# Patient Record
Sex: FEMALE | Race: WHITE | NOT HISPANIC OR LATINO
[De-identification: names, ages, dates, MRNs, and addresses within clinical notes are randomized per-mention and may not be internally consistent; named-entity substitution may affect disease eponyms.]

---

## 2019-04-25 ENCOUNTER — NON-APPOINTMENT (OUTPATIENT)
Age: 64
End: 2019-04-25

## 2019-04-25 ENCOUNTER — APPOINTMENT (OUTPATIENT)
Dept: CARDIOLOGY | Facility: CLINIC | Age: 64
End: 2019-04-25
Payer: COMMERCIAL

## 2019-04-25 VITALS
BODY MASS INDEX: 23.86 KG/M2 | DIASTOLIC BLOOD PRESSURE: 98 MMHG | WEIGHT: 152 LBS | HEIGHT: 67 IN | SYSTOLIC BLOOD PRESSURE: 172 MMHG

## 2019-04-25 DIAGNOSIS — F17.200 NICOTINE DEPENDENCE, UNSPECIFIED, UNCOMPLICATED: ICD-10-CM

## 2019-04-25 PROCEDURE — 93000 ELECTROCARDIOGRAM COMPLETE: CPT

## 2019-04-25 PROCEDURE — 99244 OFF/OP CNSLTJ NEW/EST MOD 40: CPT

## 2019-04-25 NOTE — ASSESSMENT
[FreeTextEntry1] : #1 abnormal EKG--most likely related to left bundle branch block and possibly LVH as well.\par I do not see any definite evidence to suggest prior infarction although I cannot exclude this. I do suspect that the EKG changes reflect previously unrecognized hypertension as well as left bundle branch block. The latter was intermittently present on EKG of April 23 but is present throughout today. This may be rate related left bundle branch block.\par Given this patient's risk factors of lifelong smoking and positive family history and age of 63, the presence of coronary artery disease must be evaluated. Because of the presence of a left bundle branch block, stress testing will need to be pharmacologic in the form of LexiScan Myoview.\par \par #2 possible essential hypertension with elevated blood pressure today\par \par #3 likely new onset left bundle branch block which may be idiopathic, or reflect the presence of hypertension, or even CAD\par \par #4 unknown lipid status\par \par #5 lifelong cigarette smoking and addiction--will require a chest x-ray to start which has already been ordered by Dr. Mo.\par \par #6 for a left breast mass: Scheduled for mammogram by Dr. Mo\par \par #7 need to obtain lab work, chest x-ray, and carotid ultrasound as well as mammogram which we'll order by Dr. Mo.

## 2019-04-25 NOTE — HISTORY OF PRESENT ILLNESS
[FreeTextEntry1] : The patient was referred by Dr. Guilherme Mo([phone]) because of an abnormal EKG.\par \par The patient had not seen a physician in many years but recently felt a lump in her left breast. She saw Dr. Mo and he confirmed the presence of a mass and ordered a mammogram. He also performed an EKG and informed her that it was abnormal and that she might have had a heart attack at some point. She was referred here.\par \par The patient has no prior cardiac history. Specifically no history of heart attack or chest pains that she can recall. No significant shortness of breath or dyspnea on exertion. No history of heart murmur or rheumatic fever. No history of dizziness, lightheadedness, syncope, or palpitations. No recent fevers or chills. Her weight is stable.\par --Risk factors for CAD include the  following: Smoking (originally a pack and a half per day but now down to less than one pack) and positive family history (father  of an MI in his 60s).\par --There is no history of hypertension, diabetes, or hyperlipidemia although it is again noted that she had not seen a physician for many years.\par \par EKG from Dr. Webb office from 2019 reveals sinus rhythm with normal axis increased voltage, poor R-wave progression, and a rate related left bundle branch block versus PVCs.\par \par An EKG performed today in this office reveals sinus rhythm without classic left bundle branch block.

## 2019-04-25 NOTE — PHYSICAL EXAM
[General Appearance - Well Developed] : well developed [Normal Appearance] : normal appearance [Well Groomed] : well groomed [General Appearance - Well Nourished] : well nourished [No Deformities] : no deformities [General Appearance - In No Acute Distress] : no acute distress [Normal Conjunctiva] : the conjunctiva exhibited no abnormalities [Eyelids - No Xanthelasma] : the eyelids demonstrated no xanthelasmas [No Oral Pallor] : no oral pallor [Normal Oral Mucosa] : normal oral mucosa [Normal Jugular Venous V Waves Present] : normal jugular venous V waves present [Normal Jugular Venous A Waves Present] : normal jugular venous A waves present [No Oral Cyanosis] : no oral cyanosis [No Jugular Venous Mac A Waves] : no jugular venous mac A waves [Respiration, Rhythm And Depth] : normal respiratory rhythm and effort [Exaggerated Use Of Accessory Muscles For Inspiration] : no accessory muscle use [Normal] : normal [5th Left ICS - MCL] : palpated at the 5th LICS in the midclavicular line [Auscultation Breath Sounds / Voice Sounds] : lungs were clear to auscultation bilaterally [Normal Rate] : normal [Normal S1] : normal S1 [Rhythm Regular] : regular [No Murmur] : no murmurs heard [Normal S2] : normal S2 [No Abnormalities] : the abdominal aorta was not enlarged and no bruit was heard [Abdomen Soft] : soft [Abdomen Tenderness] : non-tender [Abnormal Walk] : normal gait [Abdomen Mass (___ Cm)] : no abdominal mass palpated [Nail Clubbing] : no clubbing of the fingernails [Gait - Sufficient For Exercise Testing] : the gait was sufficient for exercise testing [Cyanosis, Localized] : no localized cyanosis [Petechial Hemorrhages (___cm)] : no petechial hemorrhages [Skin Color & Pigmentation] : normal skin color and pigmentation [] : no rash [No Venous Stasis] : no venous stasis [Skin Lesions] : no skin lesions [No Skin Ulcers] : no skin ulcer [No Xanthoma] : no  xanthoma was observed [Mood] : the mood was normal [Affect] : the affect was normal [Oriented To Time, Place, And Person] : oriented to person, place, and time [No Anxiety] : not feeling anxious [Right Carotid Bruit] : no bruit heard over the right carotid [Left Carotid Bruit] : no bruit heard over the left carotid [Right Femoral Bruit] : no bruit heard over the right femoral artery [Left Femoral Bruit] : no bruit heard over the left femoral artery [Rt] : no varicose veins of the right leg [Lt] : no varicose veins of the left leg

## 2019-04-26 ENCOUNTER — TRANSCRIPTION ENCOUNTER (OUTPATIENT)
Age: 64
End: 2019-04-26

## 2019-05-09 ENCOUNTER — RESULT REVIEW (OUTPATIENT)
Age: 64
End: 2019-05-09

## 2019-05-23 NOTE — HISTORY OF PRESENT ILLNESS
[FreeTextEntry1] : The patient was referred by Dr. Guilherme Mo ([phone]) because of an abnormal EKG.\par \par The patient had not seen a physician in many years but recently felt a lump in her left breast. She saw Dr. Mo and he confirmed the presence of a mass and ordered a mammogram. He also performed an EKG and informed her that it was abnormal and that she might have had a heart attack at some point. She was referred here.\par \par The patient has no prior cardiac history. Specifically no history of heart attack or chest pains that she can recall. No significant shortness of breath or dyspnea on exertion. No history of heart murmur or rheumatic fever. No history of dizziness, lightheadedness, syncope, or palpitations. No recent fevers or chills. Her weight is stable.\par --Risk factors for CAD include the  following: Smoking (originally a pack and a half per day but now down to less than one pack) and positive family history (father  of an MI in his 60s).\par --There is no history of hypertension, diabetes, or hyperlipidemia although it is again noted that she had not seen a physician for many years.\par \par EKG from Dr. Webb office from 2019 reveals sinus rhythm with normal axis increased voltage, poor R-wave progression, and a rate related left bundle branch block versus PVCs.\par \par An EKG performed today in this office reveals sinus rhythm without classic left bundle branch block.\par She was referred for Lexiscan stress testing, due to LBBB:\par Lexiscan THALLIUM (Myoview not available, apparently): May 9, 2019\par Her were no symptoms and there were no diagnostic EKG changes.\par SPECT imaging revealed a small zone of moderately diminished tracer uptake in the anteroseptal segment. There may be mild improvement on rest imaging.\par Normal left size, wall motion, and ejection fraction of 79%.\par Also I thought reveals a mainly fixed anterior septal defect

## 2019-05-23 NOTE — ASSESSMENT
[FreeTextEntry1] : #1 EKG changes are subtle and likely reflect intermittent left bundle branch block and possibly LVH as well.\par I do not see any definite evidence to suggest prior infarction. I do suspect that the EKG changes reflect previously unrecognized hypertension as well as left bundle branch block.\par The stress test is borderline. SPECT imaging suggests a very small and mild zone of intraseptal ischemia. Both light clots show a fixed defect. Most likely these changes reflect the presence of left bundle-branch block although I cannot exclude a small and mild degree of ischemia. In any case, this is certainly a low risk test suggesting that it is either normal or compatible with mild single-vessel disease. Therefore there is no contraindication to proceeding with surgery.\par \par \par #2 possible essential hypertension with elevated blood pressure today\par \par #3 New left bundle branch block --not clinically significant\par \par #4 unknown lipid status\par \par #5 lifelong cigarette smoking and addiction--will require a chest x-ray to start which has already been ordered by Dr. Mo.\par \par #6 for a left breast mass: Scheduled for mammogram by Dr. Mo\par \par There is no contraindication to any surgery if indicated. She is asymptomatic woman, without any recent clinical events, with preserved and normal LV function, and now a normal or borderline normal and certainly low risk nuclear stress test. It is therefore safe to proceed. I would consider her to be low risk for any operative procedure for a woman of 63 years. Before she has been advised to discontinue smoking in advance of any operative procedure.

## 2019-05-23 NOTE — PHYSICAL EXAM
[General Appearance - Well Developed] : well developed [Normal Appearance] : normal appearance [Well Groomed] : well groomed [General Appearance - Well Nourished] : well nourished [No Deformities] : no deformities [General Appearance - In No Acute Distress] : no acute distress [Normal Conjunctiva] : the conjunctiva exhibited no abnormalities [Eyelids - No Xanthelasma] : the eyelids demonstrated no xanthelasmas [Normal Oral Mucosa] : normal oral mucosa [No Oral Pallor] : no oral pallor [No Oral Cyanosis] : no oral cyanosis [Normal Jugular Venous A Waves Present] : normal jugular venous A waves present [Normal Jugular Venous V Waves Present] : normal jugular venous V waves present [No Jugular Venous Mac A Waves] : no jugular venous mac A waves [Respiration, Rhythm And Depth] : normal respiratory rhythm and effort [Exaggerated Use Of Accessory Muscles For Inspiration] : no accessory muscle use [Auscultation Breath Sounds / Voice Sounds] : lungs were clear to auscultation bilaterally [Abdomen Soft] : soft [Abdomen Tenderness] : non-tender [Abdomen Mass (___ Cm)] : no abdominal mass palpated [Abnormal Walk] : normal gait [Gait - Sufficient For Exercise Testing] : the gait was sufficient for exercise testing [Nail Clubbing] : no clubbing of the fingernails [Cyanosis, Localized] : no localized cyanosis [Petechial Hemorrhages (___cm)] : no petechial hemorrhages [Skin Color & Pigmentation] : normal skin color and pigmentation [] : no rash [No Venous Stasis] : no venous stasis [Skin Lesions] : no skin lesions [No Skin Ulcers] : no skin ulcer [No Xanthoma] : no  xanthoma was observed [Oriented To Time, Place, And Person] : oriented to person, place, and time [Affect] : the affect was normal [Mood] : the mood was normal [No Anxiety] : not feeling anxious [5th Left ICS - MCL] : palpated at the 5th LICS in the midclavicular line [Normal] : normal [Normal Rate] : normal [Rhythm Regular] : regular [Normal S1] : normal S1 [Normal S2] : normal S2 [No Murmur] : no murmurs heard [No Abnormalities] : the abdominal aorta was not enlarged and no bruit was heard [Right Carotid Bruit] : no bruit heard over the right carotid [Left Carotid Bruit] : no bruit heard over the left carotid [Right Femoral Bruit] : no bruit heard over the right femoral artery [Left Femoral Bruit] : no bruit heard over the left femoral artery [Rt] : no varicose veins of the right leg [Lt] : no varicose veins of the left leg

## 2019-05-30 ENCOUNTER — APPOINTMENT (OUTPATIENT)
Dept: CARDIOLOGY | Facility: CLINIC | Age: 64
End: 2019-05-30
Payer: COMMERCIAL

## 2019-06-02 ENCOUNTER — FORM ENCOUNTER (OUTPATIENT)
Age: 64
End: 2019-06-02

## 2019-06-06 ENCOUNTER — APPOINTMENT (OUTPATIENT)
Dept: CARDIOLOGY | Facility: CLINIC | Age: 64
End: 2019-06-06
Payer: COMMERCIAL

## 2019-06-06 ENCOUNTER — NON-APPOINTMENT (OUTPATIENT)
Age: 64
End: 2019-06-06

## 2019-06-06 VITALS
HEIGHT: 67 IN | WEIGHT: 152 LBS | BODY MASS INDEX: 23.86 KG/M2 | DIASTOLIC BLOOD PRESSURE: 74 MMHG | SYSTOLIC BLOOD PRESSURE: 126 MMHG

## 2019-06-06 PROCEDURE — 36415 COLL VENOUS BLD VENIPUNCTURE: CPT

## 2019-06-06 PROCEDURE — 93000 ELECTROCARDIOGRAM COMPLETE: CPT

## 2019-06-06 PROCEDURE — 99214 OFFICE O/P EST MOD 30 MIN: CPT

## 2019-06-06 RX ORDER — UBIDECARENONE 30 MG
CAPSULE ORAL
Refills: 0 | Status: ACTIVE | COMMUNITY

## 2019-06-06 RX ORDER — UBIDECARENONE/VIT E ACET 100MG-5
CAPSULE ORAL
Refills: 0 | Status: ACTIVE | COMMUNITY

## 2019-06-06 NOTE — HISTORY OF PRESENT ILLNESS
[FreeTextEntry1] : The patient was referred by Dr. Guilherme Mo ([phone]) because of an abnormal EKG.\par \par The patient had not seen a physician in many years but recently felt a lump in her left breast. She saw Dr. Mo and he confirmed the presence of a mass and ordered a mammogram. He also performed an EKG and informed her that it was abnormal and she was told that she might have had a heart attack at some point. She was referred here.\par \par The patient has no prior cardiac history. Specifically no history of heart attack or chest pains that she can recall. No significant shortness of breath or dyspnea on exertion. No history of heart murmur or rheumatic fever. No history of dizziness, lightheadedness, syncope, or palpitations. No recent fevers or chills. Her weight is stable.\par --Risk factors for CAD include the  following: Smoking (originally a pack and a half per day but now down to less than one pack) and positive family history (father  of an MI in his 60s).\par --There is no history of hypertension, diabetes, or hyperlipidemia although it is again noted that she had not seen a physician for many years.\par \par EKG from Dr. Webb office from 2019 reveals sinus rhythm with normal axis increased voltage, poor R-wave progression, and a rate related left bundle branch block versus PVCs.\par \par An EKG performed today in this office reveals sinus rhythm without classic left bundle branch block.\par ===============================================================================\par She was referred for Lexiscan stress testing, due to LBBB:\par Lexiscan THALLIUM (Myoview not available, apparently): May 9, 2019\par Her were no symptoms and there were no diagnostic EKG changes.\par SPECT imaging revealed a small zone of moderately diminished tracer uptake in the anteroseptal segment. There may be mild improvement on rest imaging.\par Normal left size, wall motion, and ejection fraction of 79%.\par Also there is a mainly fixed mild anterior septal defect\par \par NB: Labs from Dr. Mo on 19 revealed an elevated Chol of 270, with LDL of 170 !!\par and HDL of 65, trig 140. He started her on Simvastatin 20 mg\par \par Carotid u/s ( 2019:  \par 1) Normal Left ICA\par 2) 6069% ELIAS stenosis

## 2019-06-06 NOTE — ASSESSMENT
[FreeTextEntry1] : Guilherme:\par #1 EKG changes are due to the presence of a left bundle branch block. It is impossible to interpret possible past infarction in the setting of left bundle branch block. This is most likely related to age as well as perhaps hypertension.\par \par 2) Possible, but doubt obstructive CAD--The stress test is borderline. SPECT imaging suggests a very small and mild zone of intraseptal ischemia. Both polar maps show a fixed defect. Most likely these changes reflect the presence of left bundle-branch block and/or breast attenuation artifact, although I cannot exclude a small and mild degree of ischemia. In any case, this is certainly a low risk test suggesting that it is either normal or compatible with mild single-vessel disease. Will per [facility] medical therapy in this asymptomatic individual with borderline and low risk ETT. Management will be BP control, smoking cessation, statin therapy for lipids.\par  3) Therefore there is no contraindication to proceeding with breast surgery.  She is a low risk surgical candidate for this low to moderate risk procedure.\par 4)  essential hypertension with elevated blood pressure is again elevated today. To start Losartan HCTZ 50/12.5 daily\par #5 Mixed hyperlipidemia--now on Simvastatin. Will recheck labs after 1 month\par #6  lifelong cigarette smoking and addiction--attempting to quit\par #7 for a left breast mass: OK for breast surgery if indicated. No contraindication.\par #8 Nonobstructive Carotid dx--on ASA, statin\par \par There is no contraindication to any surgery if indicated. She is asymptomatic woman, without any recent clinical events, with preserved and normal LV function, and now a normal or borderline normal and certainly low risk nuclear stress test. It is therefore safe to proceed. I would consider her to be low risk for any operative procedure for a woman of 63 years. Before she has been advised to discontinue smoking in advance of any operative procedure.

## 2019-06-08 LAB
BASOPHILS # BLD AUTO: 0.05 K/UL
BASOPHILS NFR BLD AUTO: 0.5 %
EOSINOPHIL # BLD AUTO: 0.45 K/UL
EOSINOPHIL NFR BLD AUTO: 4.2 %
HCT VFR BLD CALC: 52.6 %
HGB BLD-MCNC: 16.4 G/DL
IMM GRANULOCYTES NFR BLD AUTO: 0.4 %
LYMPHOCYTES # BLD AUTO: 2.89 K/UL
LYMPHOCYTES NFR BLD AUTO: 27.1 %
MAN DIFF?: NORMAL
MCHC RBC-ENTMCNC: 30.7 PG
MCHC RBC-ENTMCNC: 31.2 GM/DL
MCV RBC AUTO: 98.5 FL
MONOCYTES # BLD AUTO: 0.53 K/UL
MONOCYTES NFR BLD AUTO: 5 %
NEUTROPHILS # BLD AUTO: 6.71 K/UL
NEUTROPHILS NFR BLD AUTO: 62.8 %
PLATELET # BLD AUTO: 251 K/UL
RBC # BLD: 5.34 M/UL
RBC # FLD: 15 %
WBC # FLD AUTO: 10.67 K/UL

## 2019-06-09 ENCOUNTER — FORM ENCOUNTER (OUTPATIENT)
Age: 64
End: 2019-06-09

## 2019-06-10 LAB
ALBUMIN SERPL ELPH-MCNC: 4.9 G/DL
ALP BLD-CCNC: 101 U/L
ALT SERPL-CCNC: 28 U/L
ANION GAP SERPL CALC-SCNC: 11 MMOL/L
AST SERPL-CCNC: 24 U/L
BILIRUB SERPL-MCNC: 0.3 MG/DL
BUN SERPL-MCNC: 10 MG/DL
CALCIUM SERPL-MCNC: 10.4 MG/DL
CHLORIDE SERPL-SCNC: 103 MMOL/L
CHOLEST SERPL-MCNC: 212 MG/DL
CHOLEST/HDLC SERPL: 3 RATIO
CO2 SERPL-SCNC: 29 MMOL/L
CREAT SERPL-MCNC: 0.6 MG/DL
GLUCOSE SERPL-MCNC: 85 MG/DL
HDLC SERPL-MCNC: 70 MG/DL
LDLC SERPL CALC-MCNC: 104 MG/DL
POTASSIUM SERPL-SCNC: 4.9 MMOL/L
PROT SERPL-MCNC: 6.3 G/DL
SODIUM SERPL-SCNC: 143 MMOL/L
TRIGL SERPL-MCNC: 188 MG/DL

## 2019-06-16 ENCOUNTER — FORM ENCOUNTER (OUTPATIENT)
Age: 64
End: 2019-06-16

## 2019-06-23 ENCOUNTER — FORM ENCOUNTER (OUTPATIENT)
Age: 64
End: 2019-06-23

## 2019-06-30 ENCOUNTER — FORM ENCOUNTER (OUTPATIENT)
Age: 64
End: 2019-06-30

## 2019-07-22 ENCOUNTER — RX RENEWAL (OUTPATIENT)
Age: 64
End: 2019-07-22

## 2019-08-01 NOTE — PHYSICAL EXAM
[General Appearance - Well Developed] : well developed [Normal Appearance] : normal appearance [Well Groomed] : well groomed [General Appearance - Well Nourished] : well nourished [No Deformities] : no deformities [General Appearance - In No Acute Distress] : no acute distress [Normal Conjunctiva] : the conjunctiva exhibited no abnormalities [Eyelids - No Xanthelasma] : the eyelids demonstrated no xanthelasmas [Normal Oral Mucosa] : normal oral mucosa [No Oral Cyanosis] : no oral cyanosis [No Oral Pallor] : no oral pallor [Normal Jugular Venous A Waves Present] : normal jugular venous A waves present [No Jugular Venous Mac A Waves] : no jugular venous mac A waves [Normal Jugular Venous V Waves Present] : normal jugular venous V waves present [Respiration, Rhythm And Depth] : normal respiratory rhythm and effort [Exaggerated Use Of Accessory Muscles For Inspiration] : no accessory muscle use [Auscultation Breath Sounds / Voice Sounds] : lungs were clear to auscultation bilaterally [Abdomen Soft] : soft [Abdomen Tenderness] : non-tender [Abdomen Mass (___ Cm)] : no abdominal mass palpated [Abnormal Walk] : normal gait [Gait - Sufficient For Exercise Testing] : the gait was sufficient for exercise testing [Nail Clubbing] : no clubbing of the fingernails [Cyanosis, Localized] : no localized cyanosis [Petechial Hemorrhages (___cm)] : no petechial hemorrhages [Skin Color & Pigmentation] : normal skin color and pigmentation [] : no rash [No Venous Stasis] : no venous stasis [Skin Lesions] : no skin lesions [No Skin Ulcers] : no skin ulcer [No Xanthoma] : no  xanthoma was observed [Oriented To Time, Place, And Person] : oriented to person, place, and time [Mood] : the mood was normal [Affect] : the affect was normal [No Anxiety] : not feeling anxious [Normal] : normal [5th Left ICS - MCL] : palpated at the 5th LICS in the midclavicular line [Normal Rate] : normal [Rhythm Regular] : regular [Normal S1] : normal S1 [Normal S2] : normal S2 [No Murmur] : no murmurs heard [No Abnormalities] : the abdominal aorta was not enlarged and no bruit was heard [Right Carotid Bruit] : no bruit heard over the right carotid [Left Carotid Bruit] : no bruit heard over the left carotid [Right Femoral Bruit] : no bruit heard over the right femoral artery [Left Femoral Bruit] : no bruit heard over the left femoral artery [Rt] : no varicose veins of the right leg [Lt] : no varicose veins of the left leg

## 2019-08-01 NOTE — HISTORY OF PRESENT ILLNESS
[FreeTextEntry1] : The patient was referred by Dr. Guilherme Mo ([phone]) because of an abnormal EKG (LBBB).\par She is following up now for management of both Hypertension and Hyperlipidemia\par \par Scenario: The patient had not seen a physician in many years but recently felt a lump in her left breast. She saw Dr. Mo and he confirmed the presence of a mass and ordered a mammogram. He also performed an EKG and informed her that it was abnormal and she was told that she might have had a heart attack at some point. She was referred here.\par \par The patient has no prior cardiac history. Specifically no history of heart attack or chest pains that she can recall. No significant shortness of breath or dyspnea on exertion. No history of heart murmur or rheumatic fever. No history of dizziness, lightheadedness, syncope, or palpitations. No recent fevers or chills. Her weight is stable.\par --Risk factors for CAD include the  following: Smoking (originally a pack and a half per day but now down to less than one pack) and positive family history (father  of an MI in his 60s).\par --There is no history of hypertension, diabetes, or hyperlipidemia although it is again noted that she had not seen a physician for many years.\par \par EKG from Dr. Webb office from 2019 reveals sinus rhythm with normal axis increased voltage, poor R-wave progression, and a rate related left bundle branch block versus PVCs.\par \par An EKG performed  in this office reveals sinus rhythm without classic left bundle branch block.\par ===============================================================================\par She was referred for Lexiscan stress testing, due to LBBB:\par Lexiscan THALLIUM (Myoview not available, apparently): May 9, 2019\par Her were no symptoms and there were no diagnostic EKG changes.\par SPECT imaging revealed a small zone of moderately diminished tracer uptake in the anteroseptal segment. There may be mild improvement on rest imaging.\par Normal left size, wall motion, and ejection fraction of 79%.\par Also there is a mainly fixed mild anterior septal defect\par \par NB: Labs from Dr. Mo on 19 revealed an elevated Chol of 270, with LDL of 170 !!\par and HDL of 65, trig 140. He started her on Simvastatin 20 mg\par \par Carotid u/s ( 2019:  \par 1) Normal Left ICA\par 2) 6069% ELIAS stenosis\par ===================================================================================\par Currently, she remains under treatment for:\par 1)  hypertension started using losartan--HCTZ in .  \par 2) hyperlipidemia, on simvastatin.\par She continues to feel well. She is quite asymptomatic. Specifically, no cardiovascular symptoms such as chest discomfort, shortness of breath, dyspnea on exertion, orthopnea, or PND. She is taking and tolerating her medications. No new issues since last visit.

## 2019-08-08 ENCOUNTER — APPOINTMENT (OUTPATIENT)
Dept: CARDIOLOGY | Facility: CLINIC | Age: 64
End: 2019-08-08
Payer: COMMERCIAL

## 2019-08-29 ENCOUNTER — APPOINTMENT (OUTPATIENT)
Dept: CARDIOLOGY | Facility: CLINIC | Age: 64
End: 2019-08-29
Payer: COMMERCIAL

## 2019-08-29 VITALS
SYSTOLIC BLOOD PRESSURE: 124 MMHG | HEIGHT: 67 IN | WEIGHT: 152 LBS | BODY MASS INDEX: 23.86 KG/M2 | DIASTOLIC BLOOD PRESSURE: 70 MMHG

## 2019-08-29 PROCEDURE — 99213 OFFICE O/P EST LOW 20 MIN: CPT

## 2019-08-29 NOTE — REASON FOR VISIT
[FreeTextEntry1] : \par 1)Essential Hypertension\par 2)Hyperlipidemia\par 3) Hx of "Abnormal EKG"--LBBB

## 2019-08-29 NOTE — ASSESSMENT
[FreeTextEntry1] : Guilherme:\par #1 EKG changes are due to the presence of a left bundle branch block. It is impossible to interpret possible past infarction in the setting of left bundle branch block. This is most likely related to age as well as perhaps hypertension.\par \par 2) Low risk ETT--The stress test is borderline. SPECT imaging suggests a very small and mild zone of intraseptal ischemia. But polar maps show a fixed defect. Most likely these changes reflect the presence of left bundle-branch block and/or breast attenuation artifact, although I cannot exclude a small and mild degree of ischemia. In any case, this is certainly a very  low risk test suggesting that it is either normal or compatible with mild single-vessel disease. Will per [facility] medical therapy in this asymptomatic individual with borderline and low risk ETT. Management will be BP control, smoking cessation, statin therapy for lipids.\par  3) Therefore there is no contraindication to proceeding with breast surgery.  She is a low risk surgical candidate for this low to moderate risk procedure.\par 4)  essential hypertension --And blood pressure finally and nicely controlled on losartan hydrochlorothiazide. She is tolerating this nicely as well. Therefore we will continue with this regimen there\par #5 Mixed hyperlipidemia--now on Simvastatin, at goal\par #6  lifelong cigarette smoking and addiction--attempting to quit\par #7 for a left breast mass: OK for breast surgery if indicated. No contraindication.\par #8 Nonobstructive Carotid dx--on ASA, statin\par \par There is no contraindication to any surgery if indicated. She is asymptomatic woman, without any recent clinical events, with preserved and normal LV function, and now a normal or borderline normal and certainly low risk nuclear stress test. It is therefore safe to proceed. I would consider her to be low risk for any operative procedure for a woman of 63 years. Before she has been advised to discontinue smoking in advance of any operative procedure.

## 2019-11-14 PROBLEM — R94.31 ABNORMAL FINDING ON EKG: Status: ACTIVE | Noted: 2019-04-25

## 2019-11-14 NOTE — PHYSICAL EXAM
[General Appearance - Well Developed] : well developed [Normal Appearance] : normal appearance [Well Groomed] : well groomed [General Appearance - Well Nourished] : well nourished [No Deformities] : no deformities [Normal Conjunctiva] : the conjunctiva exhibited no abnormalities [General Appearance - In No Acute Distress] : no acute distress [Normal Oral Mucosa] : normal oral mucosa [No Oral Pallor] : no oral pallor [Eyelids - No Xanthelasma] : the eyelids demonstrated no xanthelasmas [No Oral Cyanosis] : no oral cyanosis [Normal Jugular Venous A Waves Present] : normal jugular venous A waves present [Normal Jugular Venous V Waves Present] : normal jugular venous V waves present [No Jugular Venous Mac A Waves] : no jugular venous mac A waves [Respiration, Rhythm And Depth] : normal respiratory rhythm and effort [Exaggerated Use Of Accessory Muscles For Inspiration] : no accessory muscle use [Auscultation Breath Sounds / Voice Sounds] : lungs were clear to auscultation bilaterally [Abdomen Tenderness] : non-tender [Abdomen Soft] : soft [Abdomen Mass (___ Cm)] : no abdominal mass palpated [Gait - Sufficient For Exercise Testing] : the gait was sufficient for exercise testing [Abnormal Walk] : normal gait [Nail Clubbing] : no clubbing of the fingernails [Petechial Hemorrhages (___cm)] : no petechial hemorrhages [Cyanosis, Localized] : no localized cyanosis [Skin Color & Pigmentation] : normal skin color and pigmentation [] : no rash [No Venous Stasis] : no venous stasis [No Skin Ulcers] : no skin ulcer [Skin Lesions] : no skin lesions [No Xanthoma] : no  xanthoma was observed [Affect] : the affect was normal [Oriented To Time, Place, And Person] : oriented to person, place, and time [Mood] : the mood was normal [No Anxiety] : not feeling anxious [5th Left ICS - MCL] : palpated at the 5th LICS in the midclavicular line [Normal Rate] : normal [Normal] : normal [Rhythm Regular] : regular [Normal S2] : normal S2 [Normal S1] : normal S1 [No Murmur] : no murmurs heard [No Abnormalities] : the abdominal aorta was not enlarged and no bruit was heard [Right Carotid Bruit] : no bruit heard over the right carotid [Left Carotid Bruit] : no bruit heard over the left carotid [Rt] : no varicose veins of the right leg [Right Femoral Bruit] : no bruit heard over the right femoral artery [Left Femoral Bruit] : no bruit heard over the left femoral artery [Lt] : no varicose veins of the left leg

## 2019-11-14 NOTE — HISTORY OF PRESENT ILLNESS
[FreeTextEntry1] : The patient was referred by Dr. Guilherme Mo ([phone]) because of an abnormal EKG (LBBB).\par She is following up now for management of both Hypertension and Hyperlipidemia\par \par Scenario: The patient had not seen a physician in many years but recently felt a lump in her left breast. She saw Dr. Mo and he confirmed the presence of a mass and ordered a mammogram. He also performed an EKG and informed her that it was abnormal and she was told that she might have had a heart attack at some point. She was referred here.\par \par The patient has no prior cardiac history. Specifically no history of heart attack or chest pains that she can recall. No significant shortness of breath or dyspnea on exertion. No history of heart murmur or rheumatic fever. No history of dizziness, lightheadedness, syncope, or palpitations. No recent fevers or chills. Her weight is stable.\par --Risk factors for CAD include the  following: Smoking (originally a pack and a half per day but now down to less than one pack) and positive family history (father  of an MI in his 60s).\par --There is no history of hypertension, diabetes, or hyperlipidemia although it is again noted that she had not seen a physician for many years.\par \par EKG from Dr. Webb office from 2019 reveals sinus rhythm with normal axis increased voltage, poor R-wave progression, and a rate related left bundle branch block versus PVCs.\par \par An EKG performed  in this office reveals sinus rhythm without classic left bundle branch block.\par ===============================================================================\par She was referred for Lexiscan stress testing, due to LBBB:\par Lexiscan THALLIUM (Myoview not available, apparently): May 9, 2019\par Her were no symptoms and there were no diagnostic EKG changes.\par SPECT imaging revealed a small zone of moderately diminished tracer uptake in the anteroseptal segment. There may be mild improvement on rest imaging.\par Normal left size, wall motion, and ejection fraction of 79%.\par Also there is a mainly fixed mild anterior septal defect\par \par NB: Labs from Dr. Mo on 19 revealed an elevated Chol of 270, with LDL of 170 !!\par and HDL of 65, trig 140. He started her on Simvastatin 20 mg\par \par Carotid u/s ( 2019:  \par 1) Normal Left ICA\par 2) 6069% ELIAS stenosis\par ===================================================================================\par Currently, she remains under treatment for:\par 1)  hypertension started using losartan--HCTZ  50/ 12.5  \par 2) hyperlipidemia, on simvastatin 20\par \par She continues to feel well. She is quite asymptomatic. Specifically, no cardiovascular symptoms such as chest discomfort, shortness of breath, dyspnea on exertion, orthopnea, or PND. She is taking and tolerating her medications. No new issues since last visit.

## 2019-11-21 ENCOUNTER — APPOINTMENT (OUTPATIENT)
Dept: CARDIOLOGY | Facility: CLINIC | Age: 64
End: 2019-11-21
Payer: COMMERCIAL

## 2019-11-21 DIAGNOSIS — R94.31 ABNORMAL ELECTROCARDIOGRAM [ECG] [EKG]: ICD-10-CM

## 2020-01-09 NOTE — HISTORY OF PRESENT ILLNESS
[FreeTextEntry1] : The patient was referred by Dr. Guilherme Mo ([phone]) because of an abnormal EKG (LBBB).\par She is following up now for management of both Hypertension and Hyperlipidemia\par \par Scenario: The patient had not seen a physician in many years but recently felt a lump in her left breast. She saw Dr. Mo and he confirmed the presence of a mass and ordered a mammogram. He also performed an EKG and informed her that it was abnormal and she was told that she might have had a heart attack at some point. She was referred here.\par \par The patient has no prior cardiac history. Specifically no history of heart attack or chest pains that she can recall. No significant shortness of breath or dyspnea on exertion. No history of heart murmur or rheumatic fever. No history of dizziness, lightheadedness, syncope, or palpitations. No recent fevers or chills. Her weight is stable.\par --Risk factors for CAD include the  following: Smoking (originally a pack and a half per day but now down to less than one pack) and positive family history (father  of an MI in his 60s).\par --There is no history of hypertension, diabetes, or hyperlipidemia although it is again noted that she had not seen a physician for many years.\par \par EKG from Dr. Webb office from 2019 reveals sinus rhythm with normal axis increased voltage, poor R-wave progression, and a rate related left bundle branch block versus PVCs.\par \par An EKG performed  in this office reveals sinus rhythm without classic left bundle branch block.\par ===============================================================================\par She was referred for Lexiscan stress testing, due to LBBB:\par Lexiscan THALLIUM (Myoview not available, apparently): May 9, 2019\par Her were no symptoms and there were no diagnostic EKG changes.\par SPECT imaging revealed a small zone of moderately diminished tracer uptake in the anteroseptal segment. There may be mild improvement on rest imaging.\par Normal left size, wall motion, and ejection fraction of 79%.\par Also there is a mainly fixed mild anterior septal defect\par \par NB: Labs from Dr. Mo on 19 revealed an elevated Chol of 270, with LDL of 170 !!\par and HDL of 65, trig 140. He started her on Simvastatin 20 mg\par \par Carotid u/s ( 2019:  \par 1) Normal Left ICA\par 2) 60 to 69% ELIAS stenosis\par ===================================================================================\par Currently, she remains under treatment for:\par 1)  hypertension started using losartan--HCTZ  50/ 12.5  \par 2) hyperlipidemia, on simvastatin 20\par \par She continues to feel well. She is quite asymptomatic. Specifically, no cardiovascular symptoms such as chest discomfort, shortness of breath, dyspnea on exertion, orthopnea, or PND. She is taking and tolerating her medications. No new issues since last visit.

## 2020-01-09 NOTE — PHYSICAL EXAM
[General Appearance - Well Developed] : well developed [Normal Appearance] : normal appearance [Well Groomed] : well groomed [General Appearance - Well Nourished] : well nourished [Normal Conjunctiva] : the conjunctiva exhibited no abnormalities [No Deformities] : no deformities [General Appearance - In No Acute Distress] : no acute distress [Eyelids - No Xanthelasma] : the eyelids demonstrated no xanthelasmas [Normal Oral Mucosa] : normal oral mucosa [No Oral Cyanosis] : no oral cyanosis [Normal Jugular Venous A Waves Present] : normal jugular venous A waves present [No Oral Pallor] : no oral pallor [No Jugular Venous Mac A Waves] : no jugular venous mac A waves [Normal Jugular Venous V Waves Present] : normal jugular venous V waves present [Respiration, Rhythm And Depth] : normal respiratory rhythm and effort [Exaggerated Use Of Accessory Muscles For Inspiration] : no accessory muscle use [Abdomen Soft] : soft [Auscultation Breath Sounds / Voice Sounds] : lungs were clear to auscultation bilaterally [Abdomen Tenderness] : non-tender [Gait - Sufficient For Exercise Testing] : the gait was sufficient for exercise testing [Abdomen Mass (___ Cm)] : no abdominal mass palpated [Abnormal Walk] : normal gait [Cyanosis, Localized] : no localized cyanosis [Nail Clubbing] : no clubbing of the fingernails [Petechial Hemorrhages (___cm)] : no petechial hemorrhages [Skin Color & Pigmentation] : normal skin color and pigmentation [Skin Lesions] : no skin lesions [No Venous Stasis] : no venous stasis [] : no rash [No Skin Ulcers] : no skin ulcer [No Xanthoma] : no  xanthoma was observed [Oriented To Time, Place, And Person] : oriented to person, place, and time [Affect] : the affect was normal [No Anxiety] : not feeling anxious [Mood] : the mood was normal [Normal] : normal [5th Left ICS - MCL] : palpated at the 5th LICS in the midclavicular line [Normal Rate] : normal [Rhythm Regular] : regular [Normal S1] : normal S1 [Normal S2] : normal S2 [No Murmur] : no murmurs heard [No Abnormalities] : the abdominal aorta was not enlarged and no bruit was heard [Right Carotid Bruit] : no bruit heard over the right carotid [Left Carotid Bruit] : no bruit heard over the left carotid [Right Femoral Bruit] : no bruit heard over the right femoral artery [Lt] : no varicose veins of the left leg [Left Femoral Bruit] : no bruit heard over the left femoral artery [Rt] : no varicose veins of the right leg

## 2020-01-16 ENCOUNTER — APPOINTMENT (OUTPATIENT)
Dept: CARDIOLOGY | Facility: CLINIC | Age: 65
End: 2020-01-16
Payer: COMMERCIAL

## 2020-01-23 ENCOUNTER — APPOINTMENT (OUTPATIENT)
Dept: CARDIOLOGY | Facility: CLINIC | Age: 65
End: 2020-01-23
Payer: COMMERCIAL

## 2020-01-23 ENCOUNTER — NON-APPOINTMENT (OUTPATIENT)
Age: 65
End: 2020-01-23

## 2020-01-23 VITALS
HEIGHT: 67 IN | DIASTOLIC BLOOD PRESSURE: 80 MMHG | BODY MASS INDEX: 23.86 KG/M2 | SYSTOLIC BLOOD PRESSURE: 122 MMHG | WEIGHT: 152 LBS

## 2020-01-23 PROCEDURE — 99214 OFFICE O/P EST MOD 30 MIN: CPT

## 2020-01-23 PROCEDURE — 93000 ELECTROCARDIOGRAM COMPLETE: CPT

## 2020-01-23 NOTE — ASSESSMENT
[FreeTextEntry1] : Guilherme:\par #1 EKG changes are due to the presence of a left bundle branch block. It is impossible to interpret possible past infarction in the setting of left bundle branch block. This is most likely related to age as well as perhaps hypertension.\par \par 2) Low risk ETT--The stress test is borderline. SPECT imaging suggests a very small and mild zone of intraseptal ischemia. But polar maps show a fixed defect. Most likely these changes reflect the presence of left bundle-branch block and/or breast attenuation artifact, although I cannot exclude a small and mild degree of ischemia. In any case, this is certainly a very  low risk test suggesting that it is either normal or compatible with mild single-vessel disease. Will per [facility] therapy in this asymptomatic individual with borderline and low risk ETT. Management will be BP control, smoking cessation, statin therapy for lipids.\par #3) Carotid artery Dx, on ASA, statin. Last u/s May 2019--will plan f/u in 1-2 years after this date\par #4)  essential hypertension --And blood pressure finally and nicely controlled on losartan hydrochlorothiazide. She is tolerating this nicely as well. Therefore we will continue with this regimen there\par #5 Mixed hyperlipidemia--now on Simvastatin, at goal\par #6  lifelong cigarette smoking and addiction--attempting to quit\par \par \par There is no contraindication to any surgery if indicated. She is asymptomatic woman, without any recent clinical events, with preserved and normal LV function, and now a normal or borderline normal and certainly low risk nuclear stress test. It is therefore safe to proceed. I would consider her to be low risk for any operative procedure for a woman of 63 years. Before she has been advised to discontinue smoking in advance of any operative procedure.

## 2020-01-23 NOTE — PHYSICAL EXAM
[General Appearance - Well Developed] : well developed [Normal Appearance] : normal appearance [Well Groomed] : well groomed [General Appearance - Well Nourished] : well nourished [General Appearance - In No Acute Distress] : no acute distress [No Deformities] : no deformities [Eyelids - No Xanthelasma] : the eyelids demonstrated no xanthelasmas [Normal Conjunctiva] : the conjunctiva exhibited no abnormalities [No Oral Pallor] : no oral pallor [Normal Oral Mucosa] : normal oral mucosa [Normal Jugular Venous A Waves Present] : normal jugular venous A waves present [No Oral Cyanosis] : no oral cyanosis [Normal Jugular Venous V Waves Present] : normal jugular venous V waves present [No Jugular Venous Mac A Waves] : no jugular venous mac A waves [Exaggerated Use Of Accessory Muscles For Inspiration] : no accessory muscle use [Respiration, Rhythm And Depth] : normal respiratory rhythm and effort [Auscultation Breath Sounds / Voice Sounds] : lungs were clear to auscultation bilaterally [Abdomen Soft] : soft [Abdomen Tenderness] : non-tender [Abdomen Mass (___ Cm)] : no abdominal mass palpated [Gait - Sufficient For Exercise Testing] : the gait was sufficient for exercise testing [Abnormal Walk] : normal gait [Cyanosis, Localized] : no localized cyanosis [Nail Clubbing] : no clubbing of the fingernails [Petechial Hemorrhages (___cm)] : no petechial hemorrhages [] : no rash [Skin Color & Pigmentation] : normal skin color and pigmentation [Skin Lesions] : no skin lesions [No Venous Stasis] : no venous stasis [No Xanthoma] : no  xanthoma was observed [No Skin Ulcers] : no skin ulcer [Affect] : the affect was normal [Oriented To Time, Place, And Person] : oriented to person, place, and time [Mood] : the mood was normal [No Anxiety] : not feeling anxious [5th Left ICS - MCL] : palpated at the 5th LICS in the midclavicular line [Normal] : normal [Normal Rate] : normal [Normal S1] : normal S1 [Rhythm Regular] : regular [Normal S2] : normal S2 [No Murmur] : no murmurs heard [No Abnormalities] : the abdominal aorta was not enlarged and no bruit was heard [Right Carotid Bruit] : no bruit heard over the right carotid [Left Carotid Bruit] : no bruit heard over the left carotid [Right Femoral Bruit] : no bruit heard over the right femoral artery [Rt] : no varicose veins of the right leg [Left Femoral Bruit] : no bruit heard over the left femoral artery [Lt] : no varicose veins of the left leg

## 2020-01-23 NOTE — HISTORY OF PRESENT ILLNESS
[FreeTextEntry1] : The patient was referred by Dr. Guilherme Mo ([phone]) because of an abnormal EKG (LBBB).\par She is following up now for management of both Hypertension and Hyperlipidemia\par \par Scenario: The patient had not seen a physician in many years but recently. She saw Dr. Mo who performed an EKG and informed her that it was abnormal and she was told that she might have had a heart attack at some point. She was referred here.\par \par The patient has no prior cardiac history. Specifically no history of heart attack or chest pains that she can recall. No significant shortness of breath or dyspnea on exertion. No history of heart murmur or rheumatic fever. No history of dizziness, lightheadedness, syncope, or palpitations. No recent fevers or chills. Her weight is stable.\par --Risk factors for CAD include the  following: Smoking (originally a pack and a half per day but now down to less than one pack) and positive family history (father  of an MI in his 60s).\par --There is no history of hypertension, diabetes, or hyperlipidemia although it is again noted that she had not seen a physician for many years.\par \par EKG from Dr. Webb office from 2019 reveals sinus rhythm with normal axis increased voltage, poor R-wave progression, and a rate related left bundle branch block versus PVCs.\par \par An EKG performed  in this office reveals sinus rhythm without classic left bundle branch block.\par ===============================================================================\par She was referred for Lexiscan stress testing, due to LBBB:\par Lexiscan THALLIUM (Myoview not available, apparently): May 9, 2019\par Her were no symptoms and there were no diagnostic EKG changes.\par SPECT imaging revealed a small zone of moderately diminished tracer uptake in the anteroseptal segment. There may be mild improvement on rest imaging.\par Normal left size, wall motion, and ejection fraction of 79%.\par Also there is a mainly fixed mild anterior septal defect\par \par NB: Initial labs from Dr. Mo on 19 revealed an elevated Chol of 270, with LDL of 170 !!\par and HDL of 65, trig 140. He started her on Simvastatin 20 mg\par \par Carotid u/s ( 2019:  \par 1) Normal Left ICA\par 2) 60 to 69% ELIAS stenosis\par ===================================================================================\par Currently, she remains under treatment for:\par 1)  hypertension started using losartan--HCTZ  50/ 12.5  \par 2) hyperlipidemia, on simvastatin 20\par \par She continues to feel well. She is quite asymptomatic. Specifically, no cardiovascular symptoms such as chest discomfort, shortness of breath, dyspnea on exertion, orthopnea, or PND. She is taking and tolerating her medications. No new issues since last visit.

## 2020-01-24 LAB
ALBUMIN SERPL ELPH-MCNC: 4.5 G/DL
ALP BLD-CCNC: 98 U/L
ALT SERPL-CCNC: 17 U/L
ANION GAP SERPL CALC-SCNC: 13 MMOL/L
AST SERPL-CCNC: 17 U/L
BASOPHILS # BLD AUTO: 0.03 K/UL
BASOPHILS NFR BLD AUTO: 0.3 %
BILIRUB SERPL-MCNC: 0.3 MG/DL
BUN SERPL-MCNC: 8 MG/DL
CALCIUM SERPL-MCNC: 10 MG/DL
CHLORIDE SERPL-SCNC: 101 MMOL/L
CHOLEST SERPL-MCNC: 194 MG/DL
CHOLEST/HDLC SERPL: 2.8 RATIO
CO2 SERPL-SCNC: 25 MMOL/L
CREAT SERPL-MCNC: 0.61 MG/DL
EOSINOPHIL # BLD AUTO: 0.39 K/UL
EOSINOPHIL NFR BLD AUTO: 4.1 %
GLUCOSE SERPL-MCNC: 98 MG/DL
HCT VFR BLD CALC: 48.4 %
HDLC SERPL-MCNC: 69 MG/DL
HGB BLD-MCNC: 15.9 G/DL
IMM GRANULOCYTES NFR BLD AUTO: 0.2 %
LDLC SERPL CALC-MCNC: 100 MG/DL
LYMPHOCYTES # BLD AUTO: 2.51 K/UL
LYMPHOCYTES NFR BLD AUTO: 26.1 %
MAN DIFF?: NORMAL
MCHC RBC-ENTMCNC: 31.1 PG
MCHC RBC-ENTMCNC: 32.9 GM/DL
MCV RBC AUTO: 94.5 FL
MONOCYTES # BLD AUTO: 0.49 K/UL
MONOCYTES NFR BLD AUTO: 5.1 %
NEUTROPHILS # BLD AUTO: 6.17 K/UL
NEUTROPHILS NFR BLD AUTO: 64.2 %
PLATELET # BLD AUTO: 266 K/UL
POTASSIUM SERPL-SCNC: 4.1 MMOL/L
PROT SERPL-MCNC: 7 G/DL
RBC # BLD: 5.12 M/UL
RBC # FLD: 15.2 %
SODIUM SERPL-SCNC: 140 MMOL/L
TRIGL SERPL-MCNC: 127 MG/DL
WBC # FLD AUTO: 9.61 K/UL

## 2020-05-07 NOTE — PHYSICAL EXAM
[General Appearance - Well Developed] : well developed [Normal Appearance] : normal appearance [Well Groomed] : well groomed [General Appearance - Well Nourished] : well nourished [No Deformities] : no deformities [General Appearance - In No Acute Distress] : no acute distress [Normal Conjunctiva] : the conjunctiva exhibited no abnormalities [Eyelids - No Xanthelasma] : the eyelids demonstrated no xanthelasmas [No Oral Pallor] : no oral pallor [Normal Oral Mucosa] : normal oral mucosa [No Oral Cyanosis] : no oral cyanosis [Normal Jugular Venous A Waves Present] : normal jugular venous A waves present [Normal Jugular Venous V Waves Present] : normal jugular venous V waves present [No Jugular Venous Mac A Waves] : no jugular venous mac A waves [Respiration, Rhythm And Depth] : normal respiratory rhythm and effort [Exaggerated Use Of Accessory Muscles For Inspiration] : no accessory muscle use [Auscultation Breath Sounds / Voice Sounds] : lungs were clear to auscultation bilaterally [Abdomen Soft] : soft [Abdomen Tenderness] : non-tender [Abnormal Walk] : normal gait [Abdomen Mass (___ Cm)] : no abdominal mass palpated [Gait - Sufficient For Exercise Testing] : the gait was sufficient for exercise testing [Nail Clubbing] : no clubbing of the fingernails [Cyanosis, Localized] : no localized cyanosis [Petechial Hemorrhages (___cm)] : no petechial hemorrhages [Skin Color & Pigmentation] : normal skin color and pigmentation [] : no rash [No Venous Stasis] : no venous stasis [Skin Lesions] : no skin lesions [No Skin Ulcers] : no skin ulcer [No Xanthoma] : no  xanthoma was observed [Oriented To Time, Place, And Person] : oriented to person, place, and time [Affect] : the affect was normal [Mood] : the mood was normal [No Anxiety] : not feeling anxious [5th Left ICS - MCL] : palpated at the 5th LICS in the midclavicular line [Normal Rate] : normal [Normal] : normal [Rhythm Regular] : regular [Normal S1] : normal S1 [Normal S2] : normal S2 [No Murmur] : no murmurs heard [No Abnormalities] : the abdominal aorta was not enlarged and no bruit was heard [Right Carotid Bruit] : no bruit heard over the right carotid [Left Carotid Bruit] : no bruit heard over the left carotid [Right Femoral Bruit] : no bruit heard over the right femoral artery [Left Femoral Bruit] : no bruit heard over the left femoral artery [Rt] : no varicose veins of the right leg [Lt] : no varicose veins of the left leg

## 2020-05-07 NOTE — HISTORY OF PRESENT ILLNESS
[FreeTextEntry1] : The patient was referred by Dr. Guilherme Mo ([phone]) because of an abnormal EKG (LBBB).\par She is following up now for management of both Hypertension and Hyperlipidemia\par \par Scenario: The patient had not seen a physician in many years but recently. She saw Dr. Mo who performed an EKG and informed her that it was abnormal and she was told that she might have had a heart attack at some point. She was referred here.\par \par The patient has no prior cardiac history. Specifically no history of heart attack or chest pains that she can recall. No significant shortness of breath or dyspnea on exertion. No history of heart murmur or rheumatic fever. No history of dizziness, lightheadedness, syncope, or palpitations. No recent fevers or chills. Her weight is stable.\par --Risk factors for CAD include the  following: Smoking (originally a pack and a half per day but now down to less than one pack) and positive family history (father  of an MI in his 60s).\par --There is no history of hypertension, diabetes, or hyperlipidemia although it is again noted that she had not seen a physician for many years.\par \par EKG from Dr. Webb office from 2019 reveals sinus rhythm with normal axis increased voltage, poor R-wave progression, and a rate related left bundle branch block versus PVCs.\par \par An EKG performed  in this office reveals sinus rhythm without classic left bundle branch block.\par ===============================================================================\par She was referred for Lexiscan stress testing, due to LBBB:\par Lexiscan THALLIUM (Myoview not available, apparently): May 9, 2019\par Her were no symptoms and there were no diagnostic EKG changes.\par SPECT imaging revealed a small zone of moderately diminished tracer uptake in the anteroseptal segment. There may be mild improvement on rest imaging.\par Normal left size, wall motion, and ejection fraction of 79%.\par Also there is a mainly fixed mild anterior septal defect\par \par NB: Initial labs from Dr. Mo on 19 revealed an elevated Chol of 270, with LDL of 170 !!\par and HDL of 65, trig 140. He started her on Simvastatin 20 mg\par \par Carotid u/s ( 2019:  \par 1) Normal Left ICA\par 2) 60 to 69% ELIAS stenosis\par ===================================================================================\par Currently, she remains under treatment for:\par 1)  hypertension started using losartan--HCTZ  50/ 12.5  \par 2) hyperlipidemia, on simvastatin 20\par \par She continues to feel well. She is quite asymptomatic. Specifically, no cardiovascular symptoms such as chest discomfort, shortness of breath, dyspnea on exertion, orthopnea, or PND. She is taking and tolerating her medications. No new issues since last visit.

## 2020-05-14 ENCOUNTER — APPOINTMENT (OUTPATIENT)
Dept: CARDIOLOGY | Facility: CLINIC | Age: 65
End: 2020-05-14
Payer: COMMERCIAL

## 2020-05-21 ENCOUNTER — APPOINTMENT (OUTPATIENT)
Dept: CARDIOLOGY | Facility: CLINIC | Age: 65
End: 2020-05-21
Payer: COMMERCIAL

## 2020-05-21 ENCOUNTER — NON-APPOINTMENT (OUTPATIENT)
Age: 65
End: 2020-05-21

## 2020-05-21 VITALS
HEIGHT: 67 IN | DIASTOLIC BLOOD PRESSURE: 80 MMHG | BODY MASS INDEX: 23.86 KG/M2 | SYSTOLIC BLOOD PRESSURE: 124 MMHG | WEIGHT: 152 LBS

## 2020-05-21 PROCEDURE — 99214 OFFICE O/P EST MOD 30 MIN: CPT

## 2020-05-21 PROCEDURE — 93000 ELECTROCARDIOGRAM COMPLETE: CPT

## 2020-05-21 NOTE — REASON FOR VISIT
[FreeTextEntry1] : \par 1)Essential Hypertension--on losartan--HCTZ\par 2)Hyperlipidemia--on simvastatin\par 3) Hx of "Abnormal EKG"--LBBB

## 2020-05-21 NOTE — HISTORY OF PRESENT ILLNESS
[FreeTextEntry1] : The patient was referred by Dr. Guilherme Mo ([phone]) because of an abnormal EKG (LBBB).\par She is following up now for management of both Hypertension and Hyperlipidemia\par \par Scenario: The patient had not seen a physician in many years but recently. She saw Dr. Mo who performed an EKG and informed her that it was abnormal and she was told that she might have had a heart attack at some point. She was referred here.\par \par The patient has no prior cardiac history. Specifically no history of heart attack or chest pains that she can recall. No significant shortness of breath or dyspnea on exertion. No history of heart murmur or rheumatic fever. No history of dizziness, lightheadedness, syncope, or palpitations. No recent fevers or chills. Her weight is stable.\par --Risk factors for CAD include the  following: Smoking (originally a pack and a half per day but now down to less than one pack) and positive family history (father  of an MI in his 60s).\par --There is no history of hypertension, diabetes, or hyperlipidemia although it is again noted that she had not seen a physician for many years.\par \par EKG from Dr. Webb office from 2019 reveals sinus rhythm with normal axis increased voltage, poor R-wave progression, and a rate related left bundle branch block versus PVCs.\par \par An EKG performed  in this office reveals sinus rhythm without classic left bundle branch block.\par ===============================================================================\par She was referred for Lexiscan stress testing, due to LBBB:\par Lexiscan THALLIUM (Myoview not available, apparently): May 9, 2019\par Her were no symptoms and there were no diagnostic EKG changes.\par SPECT imaging revealed a small zone of moderately diminished tracer uptake in the anteroseptal segment. There may be mild improvement on rest imaging.\par Normal left size, wall motion, and ejection fraction of 79%.\par Also there is a mainly fixed mild anterior septal defect\par \par NB: Initial labs from Dr. Mo on 19 revealed an elevated Chol of 270, with LDL of 170 !!\par and HDL of 65, trig 140. He started her on Simvastatin 20 mg\par \par Carotid u/s ( 2019:  \par 1) Normal Left ICA\par 2) 60 to 69% ELIAS stenosis\par ===================================================================================\par Currently, she remains under treatment for:\par 1)  hypertension started using losartan--HCTZ  50/ 12.5  \par 2) hyperlipidemia, on simvastatin 20\par \par She continues to feel well. She is quite asymptomatic. Specifically, no cardiovascular symptoms such as chest discomfort, shortness of breath, dyspnea on exertion, orthopnea, or PND. She is taking and tolerating her medications. No new issues since last visit.

## 2020-05-21 NOTE — PHYSICAL EXAM
[General Appearance - Well Developed] : well developed [Normal Appearance] : normal appearance [Well Groomed] : well groomed [General Appearance - Well Nourished] : well nourished [General Appearance - In No Acute Distress] : no acute distress [No Deformities] : no deformities [Normal Conjunctiva] : the conjunctiva exhibited no abnormalities [Normal Oral Mucosa] : normal oral mucosa [Eyelids - No Xanthelasma] : the eyelids demonstrated no xanthelasmas [No Oral Pallor] : no oral pallor [No Oral Cyanosis] : no oral cyanosis [Normal Jugular Venous A Waves Present] : normal jugular venous A waves present [Normal Jugular Venous V Waves Present] : normal jugular venous V waves present [No Jugular Venous Mac A Waves] : no jugular venous mac A waves [Respiration, Rhythm And Depth] : normal respiratory rhythm and effort [Exaggerated Use Of Accessory Muscles For Inspiration] : no accessory muscle use [Auscultation Breath Sounds / Voice Sounds] : lungs were clear to auscultation bilaterally [Abdomen Soft] : soft [Abdomen Tenderness] : non-tender [Abnormal Walk] : normal gait [Abdomen Mass (___ Cm)] : no abdominal mass palpated [Nail Clubbing] : no clubbing of the fingernails [Gait - Sufficient For Exercise Testing] : the gait was sufficient for exercise testing [Cyanosis, Localized] : no localized cyanosis [Petechial Hemorrhages (___cm)] : no petechial hemorrhages [No Venous Stasis] : no venous stasis [] : no rash [Skin Color & Pigmentation] : normal skin color and pigmentation [No Skin Ulcers] : no skin ulcer [Skin Lesions] : no skin lesions [No Xanthoma] : no  xanthoma was observed [Affect] : the affect was normal [Oriented To Time, Place, And Person] : oriented to person, place, and time [No Anxiety] : not feeling anxious [Mood] : the mood was normal [5th Left ICS - MCL] : palpated at the 5th LICS in the midclavicular line [Normal] : normal [Normal Rate] : normal [Rhythm Regular] : regular [Normal S1] : normal S1 [Normal S2] : normal S2 [No Murmur] : no murmurs heard [No Abnormalities] : the abdominal aorta was not enlarged and no bruit was heard [Right Carotid Bruit] : no bruit heard over the right carotid [Left Carotid Bruit] : no bruit heard over the left carotid [Right Femoral Bruit] : no bruit heard over the right femoral artery [Left Femoral Bruit] : no bruit heard over the left femoral artery [Rt] : no varicose veins of the right leg [Lt] : no varicose veins of the left leg

## 2020-05-22 LAB
ALBUMIN SERPL ELPH-MCNC: 4.8 G/DL
ALP BLD-CCNC: 100 U/L
ALT SERPL-CCNC: 18 U/L
ANION GAP SERPL CALC-SCNC: 14 MMOL/L
AST SERPL-CCNC: 18 U/L
BASOPHILS # BLD AUTO: 0.04 K/UL
BASOPHILS NFR BLD AUTO: 0.4 %
BILIRUB SERPL-MCNC: 0.4 MG/DL
BUN SERPL-MCNC: 12 MG/DL
CALCIUM SERPL-MCNC: 10.1 MG/DL
CHLORIDE SERPL-SCNC: 100 MMOL/L
CHOLEST SERPL-MCNC: 220 MG/DL
CHOLEST/HDLC SERPL: 2.6 RATIO
CO2 SERPL-SCNC: 27 MMOL/L
CREAT SERPL-MCNC: 0.55 MG/DL
EOSINOPHIL # BLD AUTO: 0.25 K/UL
EOSINOPHIL NFR BLD AUTO: 2.6 %
GLUCOSE SERPL-MCNC: 101 MG/DL
HCT VFR BLD CALC: 51.1 %
HDLC SERPL-MCNC: 86 MG/DL
HGB BLD-MCNC: 16.6 G/DL
IMM GRANULOCYTES NFR BLD AUTO: 0.4 %
LDLC SERPL CALC-MCNC: 105 MG/DL
LYMPHOCYTES # BLD AUTO: 2.26 K/UL
LYMPHOCYTES NFR BLD AUTO: 23.7 %
MAN DIFF?: NORMAL
MCHC RBC-ENTMCNC: 31.3 PG
MCHC RBC-ENTMCNC: 32.5 GM/DL
MCV RBC AUTO: 96.4 FL
MONOCYTES # BLD AUTO: 0.43 K/UL
MONOCYTES NFR BLD AUTO: 4.5 %
NEUTROPHILS # BLD AUTO: 6.52 K/UL
NEUTROPHILS NFR BLD AUTO: 68.4 %
PLATELET # BLD AUTO: 278 K/UL
POTASSIUM SERPL-SCNC: 4.5 MMOL/L
PROT SERPL-MCNC: 7.3 G/DL
RBC # BLD: 5.3 M/UL
RBC # FLD: 15.4 %
SODIUM SERPL-SCNC: 141 MMOL/L
TRIGL SERPL-MCNC: 149 MG/DL
WBC # FLD AUTO: 9.54 K/UL

## 2020-05-22 RX ORDER — SIMVASTATIN 20 MG/1
20 TABLET, FILM COATED ORAL DAILY
Qty: 90 | Refills: 3 | Status: DISCONTINUED | COMMUNITY
End: 2020-05-22

## 2020-09-17 PROBLEM — R94.31 ABNORMAL ELECTROCARDIOGRAM [ECG] [EKG]: Status: ACTIVE | Noted: 2020-05-07

## 2020-09-17 PROBLEM — I11.9 BENIGN HYPERTENSIVE HEART DISEASE WITHOUT CHF: Status: ACTIVE | Noted: 2020-01-09

## 2020-09-24 ENCOUNTER — NON-APPOINTMENT (OUTPATIENT)
Age: 65
End: 2020-09-24

## 2020-09-24 ENCOUNTER — APPOINTMENT (OUTPATIENT)
Dept: CARDIOLOGY | Facility: CLINIC | Age: 65
End: 2020-09-24
Payer: COMMERCIAL

## 2020-09-24 VITALS
DIASTOLIC BLOOD PRESSURE: 74 MMHG | SYSTOLIC BLOOD PRESSURE: 130 MMHG | BODY MASS INDEX: 23.86 KG/M2 | HEIGHT: 67 IN | WEIGHT: 152 LBS

## 2020-09-24 DIAGNOSIS — R94.31 ABNORMAL ELECTROCARDIOGRAM [ECG] [EKG]: ICD-10-CM

## 2020-09-24 DIAGNOSIS — I11.9 HYPERTENSIVE HEART DISEASE W/OUT HEART FAILURE: ICD-10-CM

## 2020-09-24 PROCEDURE — 36415 COLL VENOUS BLD VENIPUNCTURE: CPT

## 2020-09-24 PROCEDURE — 99214 OFFICE O/P EST MOD 30 MIN: CPT

## 2020-09-24 PROCEDURE — 93000 ELECTROCARDIOGRAM COMPLETE: CPT

## 2020-09-24 NOTE — HISTORY OF PRESENT ILLNESS
[FreeTextEntry1] : The patient was referred by Dr. Guilherme Mo ([phone]) because of an abnormal EKG (LBBB).\par She is following up now for management of both Hypertension and Hyperlipidemia\par \par Scenario: The patient saw Dr. Mo who performed an EKG and informed her that it was abnormal she was referred here.\par \par The patient has no prior cardiac history. Specifically no history of heart attack or chest pains that she can recall. No significant shortness of breath or dyspnea on exertion. No history of heart murmur or rheumatic fever. No history of dizziness, lightheadedness, syncope, or palpitations. No recent fevers or chills. Her weight is stable.\par --Risk factors for CAD include the  following: Smoking (originally a pack and a half per day but now down to less than one pack) and positive family history (father  of an MI in his 60s).\par --There is no history of hypertension, diabetes, or hyperlipidemia although it is again noted that she had not seen a physician for many years.\par \par EKG from Dr. Grant's office from 2019 revealed sinus rhythm with normal axis increased voltage, poor R-wave progression, and a rate related left bundle branch block versus PVCs.\par \par An EKG performed  in this office reveals sinus rhythm without classic left bundle branch block.\par ===============================================================================\par She was referred for Lexiscan stress testing, due to LBBB:\par Lexiscan THALLIUM (Myoview not available, apparently): May 9, 2019\par Her were no symptoms and there were no diagnostic EKG changes.\par SPECT imaging revealed a small zone of moderately diminished tracer uptake in the anteroseptal segment. There may be mild improvement on rest imaging.\par Normal left size, wall motion, and ejection fraction of 79%.\par Also there is a mainly fixed mild anterior septal defect\par \par NB: Initial labs from Dr. Mo on 19 revealed an elevated Chol of 270, with LDL of 170 !!\par and HDL of 65, trig 140. He started her on Simvastatin 20 mg\par \par Carotid u/s ( 2019:  \par 1) Normal Left ICA\par 2) 60 to 69% ELIAS stenosis\par ===================================================================================\par Currently, she remains under treatment for:\par 1)  hypertension started using losartan--HCTZ  50/ 12.5  \par 2) hyperlipidemia, on simvastatin 20\par \par She continues to feel well. She is quite asymptomatic. Specifically, no cardiovascular symptoms such as chest discomfort, shortness of breath, dyspnea on exertion, orthopnea, or PND. She is taking and tolerating her medications. No new issues since last visit.

## 2020-09-24 NOTE — ASSESSMENT
[FreeTextEntry1] : Guilherme:\par #1 EKG changes are due to the presence of a left bundle branch block. It is impossible to interpret possible past infarction in the setting of left bundle branch block. This is most likely related to age as well as perhaps hypertension.\par \par 2) Low risk ETT--The stress test is borderline. SPECT imaging suggests a very small and mild zone of intraseptal ischemia. But polar maps show a fixed defect. Most likely these changes reflect the presence of left bundle-branch block and/or breast attenuation artifact, although I cannot exclude a small and mild degree of ischemia. In any case, this is certainly a very  low risk test suggesting that it is either normal or compatible with mild single-vessel disease. Will per [facility] therapy in this asymptomatic individual with borderline and low risk ETT. Management will be BP control, smoking cessation, statin therapy for lipids.\par #3) Carotid artery Dx, on ASA, statin. Last u/s May 2019--will plan f/u in  2 years after this date\par #4)  essential hypertension --And blood pressure finally and nicely controlled on losartan hydrochlorothiazide. She is tolerating this nicely as well. Therefore we will continue with this regimen.\par #5 Mixed hyperlipidemia--now on Simvastatin, at goal\par #6  lifelong cigarette smoking and addiction--attempting to quit\par \par \par There is no contraindication to any surgery if indicated. She is asymptomatic woman, without any recent clinical events, with preserved and normal LV function, and now a normal or borderline normal and certainly low risk nuclear stress test. It is therefore safe to proceed. I would consider her to be low risk for any operative procedure for a woman of 63 years. Before she has been advised to discontinue smoking in advance of any operative procedure.

## 2020-09-25 LAB
ALBUMIN SERPL ELPH-MCNC: 4.8 G/DL
ALP BLD-CCNC: 111 U/L
ALT SERPL-CCNC: 17 U/L
ANION GAP SERPL CALC-SCNC: 15 MMOL/L
AST SERPL-CCNC: 19 U/L
BASOPHILS # BLD AUTO: 0.06 K/UL
BASOPHILS NFR BLD AUTO: 0.6 %
BILIRUB SERPL-MCNC: 0.4 MG/DL
BUN SERPL-MCNC: 12 MG/DL
CALCIUM SERPL-MCNC: 10.2 MG/DL
CHLORIDE SERPL-SCNC: 102 MMOL/L
CHOLEST SERPL-MCNC: 196 MG/DL
CHOLEST/HDLC SERPL: 2.7 RATIO
CO2 SERPL-SCNC: 26 MMOL/L
CREAT SERPL-MCNC: 0.61 MG/DL
EOSINOPHIL # BLD AUTO: 0.36 K/UL
EOSINOPHIL NFR BLD AUTO: 3.4 %
GLUCOSE SERPL-MCNC: 86 MG/DL
HCT VFR BLD CALC: 52.3 %
HDLC SERPL-MCNC: 73 MG/DL
HGB BLD-MCNC: 16.5 G/DL
IMM GRANULOCYTES NFR BLD AUTO: 0.2 %
LDLC SERPL CALC-MCNC: 77 MG/DL
LYMPHOCYTES # BLD AUTO: 1.95 K/UL
LYMPHOCYTES NFR BLD AUTO: 18.4 %
MAN DIFF?: NORMAL
MCHC RBC-ENTMCNC: 31.5 GM/DL
MCHC RBC-ENTMCNC: 31.9 PG
MCV RBC AUTO: 101.2 FL
MONOCYTES # BLD AUTO: 0.45 K/UL
MONOCYTES NFR BLD AUTO: 4.2 %
NEUTROPHILS # BLD AUTO: 7.78 K/UL
NEUTROPHILS NFR BLD AUTO: 73.2 %
PLATELET # BLD AUTO: 248 K/UL
POTASSIUM SERPL-SCNC: 4.3 MMOL/L
PROT SERPL-MCNC: 7 G/DL
RBC # BLD: 5.17 M/UL
RBC # FLD: 15.3 %
SODIUM SERPL-SCNC: 144 MMOL/L
TRIGL SERPL-MCNC: 232 MG/DL
WBC # FLD AUTO: 10.62 K/UL

## 2020-12-10 ENCOUNTER — FORM ENCOUNTER (OUTPATIENT)
Age: 65
End: 2020-12-10

## 2020-12-31 NOTE — HISTORY OF PRESENT ILLNESS
[FreeTextEntry1] : The patient was referred by Dr. Guilherme Mo ([phone]) because of an abnormal EKG (LBBB).\par She is following up now for management of both Hypertension and Hyperlipidemia\par \par Scenario: The patient saw Dr. Mo who performed an EKG and informed her that it was abnormal she was referred here.\par \par The patient has no prior cardiac history. Specifically no history of heart attack or chest pains that she can recall. No significant shortness of breath or dyspnea on exertion. No history of heart murmur or rheumatic fever. No history of dizziness, lightheadedness, syncope, or palpitations. No recent fevers or chills. Her weight is stable.\par --Risk factors for CAD include the  following: Smoking (originally a pack and a half per day but now down to less than one pack) and positive family history (father  of an MI in his 60s).\par --There is no history of hypertension, diabetes, or hyperlipidemia although it is again noted that she had not seen a physician for many years.\par \par EKG from Dr. Grant's office from 2019 revealed sinus rhythm with normal axis increased voltage, poor R-wave progression, and a rate related left bundle branch block versus PVCs.\par \par An EKG performed  in this office reveals sinus rhythm without classic left bundle branch block.\par ===============================================================================\par She was referred for Lexiscan stress testing, due to LBBB:\par Lexiscan THALLIUM (Myoview not available, apparently): May 9, 2019\par Her were no symptoms and there were no diagnostic EKG changes.\par SPECT imaging revealed a small zone of moderately diminished tracer uptake in the anteroseptal segment. There may be mild improvement on rest imaging.\par Normal left size, wall motion, and ejection fraction of 79%.\par Also there is a mainly fixed mild anterior septal defect\par \par NB: Initial labs from Dr. Mo on 19 revealed an elevated Chol of 270, with LDL of 170 !!\par and HDL of 65, trig 140. He started her on Simvastatin 20 mg\par \par Carotid u/s ( 2019:  \par 1) Normal Left ICA\par 2) 60 to 69% ELIAS stenosis\par ===================================================================================\par Currently, she remains under treatment for:\par 1)  hypertension started using losartan--HCTZ  50/ 12.5  \par 2) hyperlipidemia, on simvastatin 20\par \par She continues to feel well. She is quite asymptomatic. Specifically, no cardiovascular symptoms such as chest discomfort, shortness of breath, dyspnea on exertion, orthopnea, or PND. She is taking and tolerating her medications. No new issues since last visit.

## 2021-01-07 ENCOUNTER — APPOINTMENT (OUTPATIENT)
Dept: CARDIOLOGY | Facility: CLINIC | Age: 66
End: 2021-01-07
Payer: COMMERCIAL

## 2021-01-07 ENCOUNTER — NON-APPOINTMENT (OUTPATIENT)
Age: 66
End: 2021-01-07

## 2021-01-07 VITALS
WEIGHT: 150 LBS | HEIGHT: 67 IN | DIASTOLIC BLOOD PRESSURE: 64 MMHG | BODY MASS INDEX: 23.54 KG/M2 | SYSTOLIC BLOOD PRESSURE: 118 MMHG

## 2021-01-07 PROCEDURE — 36415 COLL VENOUS BLD VENIPUNCTURE: CPT

## 2021-01-07 PROCEDURE — 99072 ADDL SUPL MATRL&STAF TM PHE: CPT

## 2021-01-07 PROCEDURE — 99214 OFFICE O/P EST MOD 30 MIN: CPT

## 2021-01-07 PROCEDURE — 93000 ELECTROCARDIOGRAM COMPLETE: CPT

## 2021-01-07 NOTE — ASSESSMENT
[FreeTextEntry1] : Guilherme:\par #1 Left bundle branch block. This is most likely related to age as well as perhaps hypertension.\par \par 2) Low risk ETT--The stress test is borderline. SPECT imaging suggests a very small and mild zone of intraseptal ischemia. But polar maps show a fixed defect. Most likely these changes reflect the presence of left bundle-branch block and/or breast attenuation artifact, although I cannot exclude a small and mild degree of ischemia. In any case, this is certainly a very  low risk test suggesting that it is either normal or compatible with mild single-vessel disease. Will per [facility] therapy in this asymptomatic individual with borderline and low risk ETT. Management will be BP control, smoking cessation, statin therapy for lipids.\par #3) Carotid artery Dx, on ASA, statin. Last u/s May 2019--\par ****************will reschedule f/u Carotid duplex as well as Echo before next visit\par \par #4)  essential hypertension --And blood pressure finally and nicely controlled on losartan hydrochlorothiazide. She is tolerating this nicely as well. Therefore we will continue with this regimen.\par #5 Mixed hyperlipidemia--now on Simvastatin, at goal\par #6  lifelong cigarette smoking and addiction--attempting to quit\par \par \par There is no contraindication to any surgery if indicated. She is asymptomatic woman, without any recent clinical events, with preserved and normal LV function, and now a normal or borderline normal and certainly low risk nuclear stress test. It is therefore safe to proceed. I would consider her to be low risk for any operative procedure for a woman of 63 years. Before she has been advised to discontinue smoking in advance of any operative procedure.

## 2021-01-07 NOTE — REASON FOR VISIT
[FreeTextEntry1] : \par 1)Essential Hypertension--on losartan--HCTZ\par 2)Hyperlipidemia--on simvastatin\par 3) Hx of "Abnormal EKG"--LBBB\par 4) Asymptomatic Right Carotid Stenosis

## 2021-01-07 NOTE — HISTORY OF PRESENT ILLNESS
[FreeTextEntry1] : The patient was initially referred by Dr. Guilherme Mo ([phone]) because of:\par 1) an abnormal EKG (LBBB \par 20 Hypertension--on Losartan HCTZ 50/12.5\par 3) Hyperlipidemia--on Atorvastatin 20 mg\par 4) Asymptomatic Right Carotid stenosis--see below\par \par Scenario: The patient saw Dr. Mo who performed an EKG and informed her that it was abnormal she was referred here.\par \par The patient has no prior cardiac history. Specifically no history of heart attack or chest pains that she can recall. No significant shortness of breath or dyspnea on exertion. No history of heart murmur or rheumatic fever. No history of dizziness, lightheadedness, syncope, or palpitations. No recent fevers or chills. Her weight is stable.\par --Risk factors for CAD include the  following: Smoking (originally a pack and a half per day but now down to less than one pack) and positive family history (father  of an MI in his 60s).\par --There is no history of hypertension, diabetes, or hyperlipidemia although it is again noted that she had not seen a physician for many years.\par \par EKG from Dr. Grant's office from 2019 revealed sinus rhythm with normal axis increased voltage, poor R-wave progression, and a rate related left bundle branch block versus PVCs.\par \par An EKG performed  in this office reveals sinus rhythm without classic left bundle branch block.\par ===============================================================================\par She was referred for Lexiscan stress testing, due to LBBB:\par Lexiscan THALLIUM (Myoview not available, apparently): May 9, 2019\par Her were no symptoms and there were no diagnostic EKG changes.\par SPECT imaging revealed a small zone of moderately diminished tracer uptake in the anteroseptal segment. There may be mild improvement on rest imaging.\par Normal left size, wall motion, and ejection fraction of 79%.\par Also there is a mainly fixed mild anterior septal defect\par \par NB: Initial labs from Dr. Mo on 19 revealed an elevated Chol of 270, with LDL of 170 !!\par and HDL of 65, trig 140. He started her on Simvastatin 20 mg\par \par Carotid u/s ( 2019:  \par 1) Normal Left ICA\par 2) 60 to 69% ELIAS stenosis\par ===================================================================================\par Currently, she remains under treatment for:\par 1)  hypertension started using losartan--HCTZ  50/ 12.5  \par 2) hyperlipidemia, on simvastatin 20\par \par She continues to feel well. She is quite asymptomatic. Specifically, no cardiovascular symptoms such as chest discomfort, shortness of breath, dyspnea on exertion, orthopnea, or PND. She is taking and tolerating her medications. No new issues since last visit. But, did not have f/u Carotid study, as ordered.

## 2021-01-08 LAB
ALBUMIN SERPL ELPH-MCNC: 4.6 G/DL
ALP BLD-CCNC: 119 U/L
ALT SERPL-CCNC: 23 U/L
ANION GAP SERPL CALC-SCNC: 14 MMOL/L
AST SERPL-CCNC: 15 U/L
BASOPHILS # BLD AUTO: 0.05 K/UL
BASOPHILS NFR BLD AUTO: 0.6 %
BILIRUB SERPL-MCNC: 0.4 MG/DL
BUN SERPL-MCNC: 15 MG/DL
CALCIUM SERPL-MCNC: 10.1 MG/DL
CHLORIDE SERPL-SCNC: 101 MMOL/L
CHOLEST SERPL-MCNC: 191 MG/DL
CO2 SERPL-SCNC: 27 MMOL/L
CREAT SERPL-MCNC: 0.74 MG/DL
EOSINOPHIL # BLD AUTO: 0.35 K/UL
EOSINOPHIL NFR BLD AUTO: 4 %
GLUCOSE SERPL-MCNC: 110 MG/DL
HCT VFR BLD CALC: 50 %
HDLC SERPL-MCNC: 70 MG/DL
HGB BLD-MCNC: 16.5 G/DL
IMM GRANULOCYTES NFR BLD AUTO: 0.3 %
LDLC SERPL CALC-MCNC: 90 MG/DL
LYMPHOCYTES # BLD AUTO: 1.78 K/UL
LYMPHOCYTES NFR BLD AUTO: 20.2 %
MAN DIFF?: NORMAL
MCHC RBC-ENTMCNC: 31.6 PG
MCHC RBC-ENTMCNC: 33 GM/DL
MCV RBC AUTO: 95.8 FL
MONOCYTES # BLD AUTO: 0.46 K/UL
MONOCYTES NFR BLD AUTO: 5.2 %
NEUTROPHILS # BLD AUTO: 6.16 K/UL
NEUTROPHILS NFR BLD AUTO: 69.7 %
NONHDLC SERPL-MCNC: 121 MG/DL
PLATELET # BLD AUTO: 263 K/UL
POTASSIUM SERPL-SCNC: 4.4 MMOL/L
PROT SERPL-MCNC: 7.4 G/DL
RBC # BLD: 5.22 M/UL
RBC # FLD: 14.5 %
SODIUM SERPL-SCNC: 142 MMOL/L
TRIGL SERPL-MCNC: 155 MG/DL
WBC # FLD AUTO: 8.83 K/UL

## 2021-04-05 ENCOUNTER — RESULT REVIEW (OUTPATIENT)
Age: 66
End: 2021-04-05

## 2021-05-06 ENCOUNTER — APPOINTMENT (OUTPATIENT)
Dept: CARDIOLOGY | Facility: CLINIC | Age: 66
End: 2021-05-06
Payer: COMMERCIAL

## 2021-05-06 ENCOUNTER — NON-APPOINTMENT (OUTPATIENT)
Age: 66
End: 2021-05-06

## 2021-05-06 VITALS
BODY MASS INDEX: 23.54 KG/M2 | WEIGHT: 150 LBS | SYSTOLIC BLOOD PRESSURE: 110 MMHG | HEIGHT: 67 IN | DIASTOLIC BLOOD PRESSURE: 60 MMHG

## 2021-05-06 PROCEDURE — 99214 OFFICE O/P EST MOD 30 MIN: CPT

## 2021-05-06 PROCEDURE — 99072 ADDL SUPL MATRL&STAF TM PHE: CPT

## 2021-05-06 NOTE — PHYSICAL EXAM
[Well Developed] : well developed [Well Nourished] : well nourished [No Acute Distress] : no acute distress [Normal Venous Pressure] : normal venous pressure [No Carotid Bruit] : no carotid bruit [Normal S1, S2] : normal S1, S2 [No Rub] : no rub [No Gallop] : no gallop [Clear Lung Fields] : clear lung fields [Good Air Entry] : good air entry [No Respiratory Distress] : no respiratory distress  [Soft] : abdomen soft [Non Tender] : non-tender [No Masses/organomegaly] : no masses/organomegaly [Normal Bowel Sounds] : normal bowel sounds [Normal Gait] : normal gait [No Edema] : no edema [No Cyanosis] : no cyanosis [No Clubbing] : no clubbing [No Varicosities] : no varicosities [No Rash] : no rash [No Skin Lesions] : no skin lesions [Moves all extremities] : moves all extremities [No Focal Deficits] : no focal deficits [Normal Speech] : normal speech [Alert and Oriented] : alert and oriented [Normal memory] : normal memory [General Appearance - Well Developed] : well developed [Normal Appearance] : normal appearance [Well Groomed] : well groomed [General Appearance - Well Nourished] : well nourished [No Deformities] : no deformities [General Appearance - In No Acute Distress] : no acute distress [Normal Conjunctiva] : the conjunctiva exhibited no abnormalities [Eyelids - No Xanthelasma] : the eyelids demonstrated no xanthelasmas [Normal Oral Mucosa] : normal oral mucosa [No Oral Pallor] : no oral pallor [No Oral Cyanosis] : no oral cyanosis [Normal Jugular Venous A Waves Present] : normal jugular venous A waves present [Normal Jugular Venous V Waves Present] : normal jugular venous V waves present [No Jugular Venous Mac A Waves] : no jugular venous mac A waves [Respiration, Rhythm And Depth] : normal respiratory rhythm and effort [Exaggerated Use Of Accessory Muscles For Inspiration] : no accessory muscle use [Auscultation Breath Sounds / Voice Sounds] : lungs were clear to auscultation bilaterally [Abdomen Soft] : soft [Abdomen Tenderness] : non-tender [Abdomen Mass (___ Cm)] : no abdominal mass palpated [Abnormal Walk] : normal gait [Gait - Sufficient For Exercise Testing] : the gait was sufficient for exercise testing [Nail Clubbing] : no clubbing of the fingernails [Cyanosis, Localized] : no localized cyanosis [Petechial Hemorrhages (___cm)] : no petechial hemorrhages [Skin Color & Pigmentation] : normal skin color and pigmentation [] : no rash [No Venous Stasis] : no venous stasis [Skin Lesions] : no skin lesions [No Skin Ulcers] : no skin ulcer [No Xanthoma] : no  xanthoma was observed [Oriented To Time, Place, And Person] : oriented to person, place, and time [Affect] : the affect was normal [Mood] : the mood was normal [No Anxiety] : not feeling anxious [5th Left ICS - MCL] : palpated at the 5th LICS in the midclavicular line [Normal] : normal [Normal Rate] : normal [Rhythm Regular] : regular [Normal S1] : normal S1 [Normal S2] : normal S2 [No Murmur] : no murmurs heard [No Abnormalities] : the abdominal aorta was not enlarged and no bruit was heard [Right Carotid Bruit] : no bruit heard over the right carotid [Left Carotid Bruit] : no bruit heard over the left carotid [Right Femoral Bruit] : no bruit heard over the right femoral artery [Left Femoral Bruit] : no bruit heard over the left femoral artery [Rt] : no varicose veins of the right leg [Lt] : no varicose veins of the left leg

## 2021-05-06 NOTE — ASSESSMENT
[FreeTextEntry1] : Guilherme:\par #1 Left bundle branch block. This is most likely idiopathic, or related to age as well as perhaps hypertension.\par \par 2) Low risk ETT--The stress test is borderline. SPECT imaging suggests a very small and mild zone of intraseptal ischemia. But polar maps show a fixed defect. Most likely these changes reflect the presence of left bundle-branch block and/or breast attenuation artifact, although I cannot exclude a small and mild degree of ischemia. In any case, this is certainly a very  low risk test suggesting that it is either normal or compatible with mild single-vessel disease. Will per [facility] therapy in this asymptomatic individual with borderline and low risk ETT. Management will be BP control, smoking cessation, statin therapy for lipids.\par \par #3) Carotid artery Dx, on ASA, statin. \par Carotid ultrasound of April 2021 reveals no progression of disease since the earlier study of 2017. Since the first study, she was placed on statin therapy and thus far there is no progression. Hence we'll continue medical therapy with aspirin and statin.\par \par #4)  essential hypertension --And blood pressure finally and nicely controlled on losartan hydrochlorothiazide. She is tolerating this nicely as well. Therefore we will continue with this regimen.\par #5 Mixed hyperlipidemia--now on Simvastatin, at goal\par #6  lifelong cigarette smoking and addiction--attempting to quit. Ominous that she still smokes. Discussed quitting techniques and options\par \par \par

## 2021-05-06 NOTE — HISTORY OF PRESENT ILLNESS
[FreeTextEntry1] : The patient was initially referred by Dr. Guilherme Mo ([phone]) because of:\par 1) an abnormal EKG (LBBB) \par 20 Hypertension--on Losartan HCTZ 50/12.5\par 3) Hyperlipidemia--on Atorvastatin 20 mg\par 4) Asymptomatic Right Carotid stenosis--see below\par \par Scenario: The patient saw Dr. Mo who performed an EKG and informed her that it was abnormal she was referred here.\par \par The patient has no prior cardiac history. Specifically no history of heart attack or chest pains that she can recall. No significant shortness of breath or dyspnea on exertion. No history of heart murmur or rheumatic fever. No history of dizziness, lightheadedness, syncope, or palpitations. No recent fevers or chills. Her weight is stable.\par --Risk factors for CAD include the  following: Smoking (originally a pack and a half per day but now down to less than one pack) and positive family history (father  of an MI in his 60s).\par --There is no history of hypertension, diabetes, or hyperlipidemia although it is again noted that she had not seen a physician for many years.\par \par EKG from Dr. Grant's office from 2019 revealed sinus rhythm with normal axis increased voltage, poor R-wave progression, and a rate related left bundle branch block versus PVCs.\par \par An EKG performed  in this office reveals sinus rhythm without classic left bundle branch block.\par ===============================================================================\par She was referred for Lexiscan stress testing, due to LBBB:\par Lexiscan THALLIUM (Myoview not available, apparently): May 9, 2019\par Her were no symptoms and there were no diagnostic EKG changes.\par SPECT imaging revealed a small zone of moderately diminished tracer uptake in the anteroseptal segment. There may be mild improvement on rest imaging.\par Normal left size, wall motion, and ejection fraction of 79%.\par Also there is a mainly fixed mild anterior septal defect\par \par NB: Initial labs from Dr. Mo on 19 revealed an elevated Chol of 270, with LDL of 170 !!\par and HDL of 65, trig 140. He started her on Simvastatin 20 mg\par \par Carotid u/s ( 2019:                         Carotid u/s 21\par 1) Normal Left ICA                                Mild Plaque in the bulb and prox ICA; less than 50%\par 2) 60 to 69% ELIAS stenosis                 Mild to moderate plaque in the bulb and prox ICA, Less than 50%\par ===================================================================================\par Echocardiogram (2021):\par Normal LV size and contractility with an EF of 63%.\par Abnormal septal activation due to bundle branch block.\par Mild concentric LVH.\par Mild diastolic grade 1 dysfunction.\par Normal trileaflet aortic valve.\par Mild mitral annular calcification without MR or MS.\par Otherwise normal study.\par =======================================================================================\par Currently, she remains under treatment for:\par 1)  hypertension started using losartan--HCTZ  50/ 12.5  \par 2) hyperlipidemia, on simvastatin 20\par \par She continues to feel well. She is quite asymptomatic. Specifically, no cardiovascular symptoms such as chest discomfort, shortness of breath, dyspnea on exertion, orthopnea, or PND. She is taking and tolerating her medications. No new issues since last visit. But, did not have f/u Carotid study, as ordered.

## 2021-05-07 LAB
ALBUMIN SERPL ELPH-MCNC: 4.8 G/DL
ALP BLD-CCNC: 122 U/L
ALT SERPL-CCNC: 21 U/L
ANION GAP SERPL CALC-SCNC: 17 MMOL/L
AST SERPL-CCNC: 18 U/L
BASOPHILS # BLD AUTO: 0.05 K/UL
BASOPHILS NFR BLD AUTO: 0.5 %
BILIRUB SERPL-MCNC: 0.6 MG/DL
BUN SERPL-MCNC: 11 MG/DL
CALCIUM SERPL-MCNC: 10 MG/DL
CHLORIDE SERPL-SCNC: 100 MMOL/L
CHOLEST SERPL-MCNC: 198 MG/DL
CO2 SERPL-SCNC: 24 MMOL/L
CREAT SERPL-MCNC: 0.57 MG/DL
EOSINOPHIL # BLD AUTO: 0.23 K/UL
EOSINOPHIL NFR BLD AUTO: 2.3 %
GLUCOSE SERPL-MCNC: 89 MG/DL
HCT VFR BLD CALC: 52.7 %
HDLC SERPL-MCNC: 72 MG/DL
HGB BLD-MCNC: 17.2 G/DL
IMM GRANULOCYTES NFR BLD AUTO: 0.5 %
LDLC SERPL CALC-MCNC: 84 MG/DL
LYMPHOCYTES # BLD AUTO: 1.82 K/UL
LYMPHOCYTES NFR BLD AUTO: 18.6 %
MAN DIFF?: NORMAL
MCHC RBC-ENTMCNC: 31.4 PG
MCHC RBC-ENTMCNC: 32.6 GM/DL
MCV RBC AUTO: 96.3 FL
MONOCYTES # BLD AUTO: 0.37 K/UL
MONOCYTES NFR BLD AUTO: 3.8 %
NEUTROPHILS # BLD AUTO: 7.29 K/UL
NEUTROPHILS NFR BLD AUTO: 74.3 %
NONHDLC SERPL-MCNC: 126 MG/DL
PLATELET # BLD AUTO: 305 K/UL
POTASSIUM SERPL-SCNC: 4.1 MMOL/L
PROT SERPL-MCNC: 7.4 G/DL
RBC # BLD: 5.47 M/UL
RBC # FLD: 14.7 %
SODIUM SERPL-SCNC: 140 MMOL/L
TRIGL SERPL-MCNC: 210 MG/DL
WBC # FLD AUTO: 9.81 K/UL

## 2021-05-10 ENCOUNTER — NON-APPOINTMENT (OUTPATIENT)
Age: 66
End: 2021-05-10

## 2021-09-02 NOTE — HISTORY OF PRESENT ILLNESS
[FreeTextEntry1] : The patient was initially referred by Dr. Guilherme Mo ([phone]) because of:\par 1) an abnormal EKG (LBBB) \par 20 Hypertension--on Losartan HCTZ 50/12.5\par 3) Hyperlipidemia--on Atorvastatin 20 mg\par 4) Asymptomatic Right Carotid stenosis--see below\par \par Scenario: The patient saw Dr. Mo who performed an EKG and informed her that it was abnormal she was referred here.\par \par The patient has no prior cardiac history. Specifically no history of heart attack or chest pains that she can recall. No significant shortness of breath or dyspnea on exertion. No history of heart murmur or rheumatic fever. No history of dizziness, lightheadedness, syncope, or palpitations. No recent fevers or chills. Her weight is stable.\par --Risk factors for CAD include the  following: Smoking (originally a pack and a half per day but now down to less than one pack) and positive family history (father  of an MI in his 60s).\par --There is no history of hypertension, diabetes, or hyperlipidemia although it is again noted that she had not seen a physician for many years.\par \par EKG from Dr. Grant's office from 2019 revealed sinus rhythm with normal axis increased voltage, poor R-wave progression, and a rate related left bundle branch block versus PVCs.\par \par An EKG performed  in this office reveals sinus rhythm without classic left bundle branch block.\par ===============================================================================\par She was referred for Lexiscan stress testing, due to LBBB:\par Lexiscan THALLIUM (Myoview not available, apparently): May 9, 2019\par Her were no symptoms and there were no diagnostic EKG changes.\par SPECT imaging revealed a small zone of moderately diminished tracer uptake in the anteroseptal segment. There may be mild improvement on rest imaging.\par Normal left size, wall motion, and ejection fraction of 79%.\par Also there is a mainly fixed mild anterior septal defect\par \par NB: Initial labs from Dr. Mo on 19 revealed an elevated Chol of 270, with LDL of 170 !!\par and HDL of 65, trig 140. He started her on Simvastatin 20 mg\par \par  Carotid u/s 21                                                    \par L: Mild Plaque in the bulb and prox ICA; less than 50%\par R: Mild to moderate plaque in the bulb and prox ICA, Less than 50%\par ===================================================================================\par Echocardiogram (2021):\par Normal LV size and contractility with an EF of 63%.\par Abnormal septal activation due to bundle branch block.\par Mild concentric LVH.\par Mild diastolic grade 1 dysfunction.\par Normal trileaflet aortic valve.\par Mild mitral annular calcification without MR or MS.\par Otherwise normal study.\par =======================================================================================\par Currently, she remains under treatment for:\par 1)  hypertension started using losartan--HCTZ  50/ 12.5  \par 2) hyperlipidemia, on simvastatin 20\par \par She continues to feel well. She is quite asymptomatic. Specifically, no cardiovascular symptoms such as chest discomfort, shortness of breath, dyspnea on exertion, orthopnea, or PND. She is taking and tolerating her medications. No new issues since last visit.

## 2021-09-02 NOTE — PHYSICAL EXAM
[Well Developed] : well developed [Well Nourished] : well nourished [No Acute Distress] : no acute distress [Normal Venous Pressure] : normal venous pressure [No Carotid Bruit] : no carotid bruit [Normal S1, S2] : normal S1, S2 [No Rub] : no rub [No Gallop] : no gallop [Clear Lung Fields] : clear lung fields [Good Air Entry] : good air entry [No Respiratory Distress] : no respiratory distress  [Soft] : abdomen soft [Non Tender] : non-tender [No Masses/organomegaly] : no masses/organomegaly [Normal Bowel Sounds] : normal bowel sounds [Normal Gait] : normal gait [No Edema] : no edema [No Cyanosis] : no cyanosis [No Clubbing] : no clubbing [No Varicosities] : no varicosities [No Rash] : no rash [No Skin Lesions] : no skin lesions [Moves all extremities] : moves all extremities [No Focal Deficits] : no focal deficits [Normal Speech] : normal speech [Alert and Oriented] : alert and oriented [Normal memory] : normal memory [General Appearance - Well Developed] : well developed [Normal Appearance] : normal appearance [Well Groomed] : well groomed [General Appearance - Well Nourished] : well nourished [No Deformities] : no deformities [General Appearance - In No Acute Distress] : no acute distress [Normal Conjunctiva] : the conjunctiva exhibited no abnormalities [Eyelids - No Xanthelasma] : the eyelids demonstrated no xanthelasmas [Normal Oral Mucosa] : normal oral mucosa [No Oral Pallor] : no oral pallor [No Oral Cyanosis] : no oral cyanosis [Normal Jugular Venous A Waves Present] : normal jugular venous A waves present [Normal Jugular Venous V Waves Present] : normal jugular venous V waves present [No Jugular Venous Mac A Waves] : no jugular venous mac A waves [Respiration, Rhythm And Depth] : normal respiratory rhythm and effort [Exaggerated Use Of Accessory Muscles For Inspiration] : no accessory muscle use [Auscultation Breath Sounds / Voice Sounds] : lungs were clear to auscultation bilaterally [Abdomen Soft] : soft [Abdomen Tenderness] : non-tender [Abdomen Mass (___ Cm)] : no abdominal mass palpated [Abnormal Walk] : normal gait [Gait - Sufficient For Exercise Testing] : the gait was sufficient for exercise testing [Nail Clubbing] : no clubbing of the fingernails [Cyanosis, Localized] : no localized cyanosis [Petechial Hemorrhages (___cm)] : no petechial hemorrhages [Skin Color & Pigmentation] : normal skin color and pigmentation [] : no rash [Skin Lesions] : no skin lesions [No Venous Stasis] : no venous stasis [No Skin Ulcers] : no skin ulcer [No Xanthoma] : no  xanthoma was observed [Oriented To Time, Place, And Person] : oriented to person, place, and time [Affect] : the affect was normal [Mood] : the mood was normal [No Anxiety] : not feeling anxious [5th Left ICS - MCL] : palpated at the 5th LICS in the midclavicular line [Normal] : normal [Normal Rate] : normal [Rhythm Regular] : regular [Normal S1] : normal S1 [Normal S2] : normal S2 [No Murmur] : no murmurs heard [No Abnormalities] : the abdominal aorta was not enlarged and no bruit was heard [Right Carotid Bruit] : no bruit heard over the right carotid [Left Carotid Bruit] : no bruit heard over the left carotid [Right Femoral Bruit] : no bruit heard over the right femoral artery [Left Femoral Bruit] : no bruit heard over the left femoral artery [Rt] : no varicose veins of the right leg [Lt] : no varicose veins of the left leg

## 2021-09-09 ENCOUNTER — APPOINTMENT (OUTPATIENT)
Dept: CARDIOLOGY | Facility: CLINIC | Age: 66
End: 2021-09-09
Payer: COMMERCIAL

## 2021-10-07 ENCOUNTER — NON-APPOINTMENT (OUTPATIENT)
Age: 66
End: 2021-10-07

## 2021-10-07 ENCOUNTER — APPOINTMENT (OUTPATIENT)
Dept: CARDIOLOGY | Facility: CLINIC | Age: 66
End: 2021-10-07
Payer: COMMERCIAL

## 2021-10-07 VITALS
WEIGHT: 152 LBS | BODY MASS INDEX: 23.86 KG/M2 | SYSTOLIC BLOOD PRESSURE: 112 MMHG | HEIGHT: 67 IN | DIASTOLIC BLOOD PRESSURE: 72 MMHG

## 2021-10-07 PROCEDURE — 99072 ADDL SUPL MATRL&STAF TM PHE: CPT

## 2021-10-07 PROCEDURE — 99214 OFFICE O/P EST MOD 30 MIN: CPT

## 2021-10-07 PROCEDURE — 93000 ELECTROCARDIOGRAM COMPLETE: CPT

## 2021-10-07 PROCEDURE — 36415 COLL VENOUS BLD VENIPUNCTURE: CPT

## 2021-10-07 NOTE — HISTORY OF PRESENT ILLNESS
[FreeTextEntry1] : The patient was initially referred by Dr. Guilherme Mo ([phone]) because of:\par 1) an abnormal EKG (LBBB) \par 20 Hypertension--on Losartan HCTZ 50/12.5\par 3) Hyperlipidemia--on Atorvastatin 20 mg\par 4) Asymptomatic Right Carotid stenosis--see below\par \par Scenario: The patient saw Dr. Mo who performed an EKG and informed her that it was abnormal she was referred here.\par \par The patient has no prior cardiac history. Specifically no history of heart attack or chest pains that she can recall. No significant shortness of breath or dyspnea on exertion. No history of heart murmur or rheumatic fever. No history of dizziness, lightheadedness, syncope, or palpitations. No recent fevers or chills. Her weight is stable.\par --Risk factors for CAD include the  following: Smoking (originally a pack and a half per day but now down to less than one pack) and positive family history (father  of an MI in his 60s).\par --There is no history of hypertension, diabetes, or hyperlipidemia although it is again noted that she had not seen a physician for many years.\par \par EKG from Dr. Grant's office from 2019 revealed sinus rhythm with normal axis increased voltage, poor R-wave progression, and a rate related left bundle branch block versus PVCs.\par \par An EKG performed  in this office reveals sinus rhythm without classic left bundle branch block.\par ===============================================================================\par She was referred for Lexiscan stress testing, due to LBBB:\par Lexiscan THALLIUM (Myoview not available, apparently): May 9, 2019\par Her were no symptoms and there were no diagnostic EKG changes.\par SPECT imaging revealed a small zone of moderately diminished tracer uptake in the anteroseptal segment. There may be mild improvement on rest imaging.\par Normal left size, wall motion, and ejection fraction of 79%.\par Also there is a mainly fixed mild anterior septal defect\par \par NB: Initial labs from Dr. Mo on 19 revealed an elevated Chol of 270, with LDL of 170 !!\par and HDL of 65, trig 140. He started her on Simvastatin 20 mg\par \par  Carotid u/s 21                                                    \par L: Mild Plaque in the bulb and prox ICA; less than 50%\par R: Mild to moderate plaque in the bulb and prox ICA, Less than 50%\par ===================================================================================\par Echocardiogram (2021):\par Normal LV size and contractility with an EF of 63%.\par Abnormal septal activation due to bundle branch block.\par Mild concentric LVH.\par Mild diastolic grade 1 dysfunction.\par Normal trileaflet aortic valve.\par Mild mitral annular calcification without MR or MS.\par Otherwise normal study.\par =======================================================================================\par Currently, she remains under treatment for:\par 1)  hypertension started using losartan--HCTZ  50/ 12.5  \par 2) hyperlipidemia, on simvastatin 20\par \par She continues to feel well. She is quite asymptomatic. Specifically, no cardiovascular symptoms such as chest discomfort, shortness of breath, dyspnea on exertion, orthopnea, or PND. She is taking and tolerating her medications. No new issues since last visit.

## 2021-10-11 LAB
ALBUMIN SERPL ELPH-MCNC: 4.7 G/DL
ALP BLD-CCNC: 116 U/L
ALT SERPL-CCNC: 21 U/L
ANION GAP SERPL CALC-SCNC: 17 MMOL/L
AST SERPL-CCNC: 18 U/L
BASOPHILS # BLD AUTO: 0.06 K/UL
BASOPHILS NFR BLD AUTO: 0.6 %
BILIRUB SERPL-MCNC: 0.3 MG/DL
BUN SERPL-MCNC: 11 MG/DL
CALCIUM SERPL-MCNC: 9.9 MG/DL
CHLORIDE SERPL-SCNC: 102 MMOL/L
CHOLEST SERPL-MCNC: 205 MG/DL
CO2 SERPL-SCNC: 23 MMOL/L
CREAT SERPL-MCNC: 0.63 MG/DL
EOSINOPHIL # BLD AUTO: 0.37 K/UL
EOSINOPHIL NFR BLD AUTO: 3.9 %
GLUCOSE SERPL-MCNC: 103 MG/DL
HCT VFR BLD CALC: 48.6 %
HDLC SERPL-MCNC: 71 MG/DL
HGB BLD-MCNC: 16.5 G/DL
IMM GRANULOCYTES NFR BLD AUTO: 0.3 %
LDLC SERPL CALC-MCNC: 99 MG/DL
LYMPHOCYTES # BLD AUTO: 2.22 K/UL
LYMPHOCYTES NFR BLD AUTO: 23.5 %
MAN DIFF?: NORMAL
MCHC RBC-ENTMCNC: 31.9 PG
MCHC RBC-ENTMCNC: 34 GM/DL
MCV RBC AUTO: 94 FL
MONOCYTES # BLD AUTO: 0.58 K/UL
MONOCYTES NFR BLD AUTO: 6.2 %
NEUTROPHILS # BLD AUTO: 6.17 K/UL
NEUTROPHILS NFR BLD AUTO: 65.5 %
NONHDLC SERPL-MCNC: 134 MG/DL
PLATELET # BLD AUTO: 291 K/UL
POTASSIUM SERPL-SCNC: 4 MMOL/L
PROT SERPL-MCNC: 7.1 G/DL
RBC # BLD: 5.17 M/UL
RBC # FLD: 14.8 %
SODIUM SERPL-SCNC: 142 MMOL/L
TRIGL SERPL-MCNC: 176 MG/DL
WBC # FLD AUTO: 9.43 K/UL

## 2021-11-08 ENCOUNTER — RESULT REVIEW (OUTPATIENT)
Age: 66
End: 2021-11-08

## 2021-12-06 DIAGNOSIS — Z80.51 FAMILY HISTORY OF MALIGNANT NEOPLASM OF KIDNEY: ICD-10-CM

## 2021-12-06 DIAGNOSIS — Z80.6 FAMILY HISTORY OF LEUKEMIA: ICD-10-CM

## 2021-12-06 DIAGNOSIS — Z80.0 FAMILY HISTORY OF MALIGNANT NEOPLASM OF DIGESTIVE ORGANS: ICD-10-CM

## 2021-12-06 DIAGNOSIS — Z80.8 FAMILY HISTORY OF MALIGNANT NEOPLASM OF OTHER ORGANS OR SYSTEMS: ICD-10-CM

## 2021-12-07 ENCOUNTER — APPOINTMENT (OUTPATIENT)
Dept: BREAST CENTER | Facility: CLINIC | Age: 66
End: 2021-12-07
Payer: COMMERCIAL

## 2021-12-07 VITALS — BODY MASS INDEX: 23.86 KG/M2 | HEIGHT: 67 IN | WEIGHT: 152 LBS

## 2021-12-07 DIAGNOSIS — D24.2 BENIGN NEOPLASM OF LEFT BREAST: ICD-10-CM

## 2021-12-07 PROCEDURE — 99213 OFFICE O/P EST LOW 20 MIN: CPT

## 2021-12-07 PROCEDURE — 99072 ADDL SUPL MATRL&STAF TM PHE: CPT

## 2021-12-07 NOTE — PAST MEDICAL HISTORY
[Postmenopausal] : The patient is postmenopausal [Menopause Age____] : age at menopause was [unfilled] [Total Preg ___] : G[unfilled] [Live Births ___] : P[unfilled]  [Menarche Age ____] : age at menarche was [unfilled] [Definite ___ (Date)] : the last menstrual period was [unfilled] [Age At Live Birth ___] : Age at live birth: [unfilled] [History of Hormone Replacement Treatment] : has no history of hormone replacement treatment

## 2021-12-07 NOTE — PHYSICAL EXAM
[Normocephalic] : normocephalic [Atraumatic] : atraumatic [EOMI] : extra ocular movement intact [Supple] : supple [No Supraclavicular Adenopathy] : no supraclavicular adenopathy [No Cervical Adenopathy] : no cervical adenopathy [Examined in the supine and seated position] : examined in the supine and seated position [No dominant masses] : no dominant masses in right breast  [No dominant masses] : no dominant masses left breast [No Nipple Retraction] : no left nipple retraction [No Nipple Discharge] : no left nipple discharge [Breast Mass Right Breast ___cm] : no masses [Breast Mass Left Breast ___cm] : no masses [Breast Nipple Inversion] : nipples not inverted [Breast Nipple Retraction] : nipples not retracted [Breast Nipple Flattening] : nipples not flattened [Breast Nipple Fissures] : nipples not fissured [Breast Abnormal Lactation (Galactorrhea)] : no galactorrhea [Breast Abnormal Secretion Bloody Fluid] : no bloody discharge [Breast Abnormal Secretion Serous Fluid] : no serous discharge [Breast Abnormal Secretion Opalescent Fluid] : no milky discharge [No Axillary Lymphadenopathy] : no left axillary lymphadenopathy [No Edema] : no edema [No Rashes] : no rashes [No Ulceration] : no ulceration [de-identified] : On exam, the patient has ptotic C-cup breasts.  She has a well-healed left breast periareolar incision.  On palpation, I cannot feel any suspicious densities in either breast.  She has no axillary, supraclavicular, or cervical adenopathy.

## 2021-12-07 NOTE — ASSESSMENT
[FreeTextEntry1] : The patient is a 66-year-old G4, P4 postmenopausal white female with [place], English, and [place] descent.  She underwent menarche at age 14 and had her first child at age 23.  She underwent menopause at age 52 and never took any hormone replacement therapy.  She has no family history of breast or ovarian cancer.  Her mother had renal cell carcinoma at age 34 and later passed away from metastatic disease to the lung.  Maternal aunt had AML at age 74.  Her sister had liver and bile duct cancer and passed away at age 51.  Her maternal cousin had a glioblastoma at age 51.  The patient developed a left breast mass around April 2019 and mammography and ultrasound on May 21 and 22, 2019 showed a left breast 9:00 well defined density which on ultrasound corresponded to a 1.4 cm mass.  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  She underwent her last bilateral mammography and ultrasound which was reviewed from November 8, 2021 performed at [facility] which showed no suspicious findings.  On exam today, I cannot feel any suspicious densities in either breast. the patient was reassured and should continue yearly follow-up again in December 2022.  Her next bilateral mammography and ultrasound will be due in November 2022 and she was given prescriptions.

## 2021-12-07 NOTE — REASON FOR VISIT
[Follow-Up: _____] : a [unfilled] follow-up visit [FreeTextEntry1] : The patient comes in with a history of undergoing a left breast 9:00 core biopsy performed on April 2019 showing an intraductal papilloma which was palpable and fully excised in June 2019 just showing a benign intraductal papilloma.  She comes in for routine yearly follow-up and gets yearly mammography and ultrasound.

## 2021-12-07 NOTE — HISTORY OF PRESENT ILLNESS
[FreeTextEntry1] : The patient is a 66-year-old G4, P4 postmenopausal white female with [place], English, and [place] descent.  She underwent menarche at age 14 and had her first child at age 23.  She underwent menopause at age 52 and never took any hormone replacement therapy.  She has no family history of breast or ovarian cancer.  Her mother had renal cell carcinoma at age 34 and later passed away from metastatic disease to the lung.  Maternal aunt had AML at age 74.  Her sister had liver and bile duct cancer and passed away at age 51.  Her maternal cousin had a glioblastoma at age 51.  The patient developed a left breast mass around April 2019 and mammography and ultrasound on May 21 and 22, 2019 showed a left breast 9:00 well defined density which on ultrasound corresponded to a 1.4 cm mass.  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  She comes in for routine yearly follow-up and continues to get yearly mammography and ultrasound.

## 2021-12-10 ENCOUNTER — APPOINTMENT (OUTPATIENT)
Dept: BREAST CENTER | Facility: CLINIC | Age: 66
End: 2021-12-10

## 2022-02-03 PROBLEM — I65.29 ARTERIOSCLEROSIS OF CAROTID ARTERY, UNSPECIFIED LATERALITY: Status: ACTIVE | Noted: 2020-05-07

## 2022-02-10 ENCOUNTER — NON-APPOINTMENT (OUTPATIENT)
Age: 67
End: 2022-02-10

## 2022-02-10 ENCOUNTER — APPOINTMENT (OUTPATIENT)
Dept: CARDIOLOGY | Facility: CLINIC | Age: 67
End: 2022-02-10
Payer: COMMERCIAL

## 2022-02-10 VITALS
DIASTOLIC BLOOD PRESSURE: 72 MMHG | WEIGHT: 152 LBS | SYSTOLIC BLOOD PRESSURE: 116 MMHG | HEIGHT: 67 IN | TEMPERATURE: 97.6 F | OXYGEN SATURATION: 98 % | HEART RATE: 52 BPM | BODY MASS INDEX: 23.86 KG/M2 | RESPIRATION RATE: 16 BRPM

## 2022-02-10 DIAGNOSIS — I65.29 OCCLUSION AND STENOSIS OF UNSPECIFIED CAROTID ARTERY: ICD-10-CM

## 2022-02-10 PROCEDURE — 93000 ELECTROCARDIOGRAM COMPLETE: CPT

## 2022-02-10 PROCEDURE — 36415 COLL VENOUS BLD VENIPUNCTURE: CPT

## 2022-02-10 PROCEDURE — 99072 ADDL SUPL MATRL&STAF TM PHE: CPT

## 2022-02-10 PROCEDURE — 99214 OFFICE O/P EST MOD 30 MIN: CPT

## 2022-02-10 NOTE — HISTORY OF PRESENT ILLNESS
[FreeTextEntry1] : The patient was initially referred by Dr. Guilherme Mo ([phone]) because of:\par 1) an abnormal EKG (LBBB) \par 20 Hypertension--on Losartan HCTZ 50/12.5\par 3) Hyperlipidemia--on Atorvastatin 20 mg\par 4) Asymptomatic Right Carotid stenosis--see below\par \par Scenario: The patient saw Dr. Mo who performed an EKG and informed her that it was abnormal she was referred here.\par \par The patient has no prior cardiac history. Specifically no history of heart attack or chest pains that she can recall. No significant shortness of breath or dyspnea on exertion. No history of heart murmur or rheumatic fever. No history of dizziness, lightheadedness, syncope, or palpitations. No recent fevers or chills. Her weight is stable.\par --Risk factors for CAD include the  following: Smoking (originally a pack and a half per day but now down to less than one pack) and positive family history (father  of an MI in his 60s).\par --There is no history of hypertension, diabetes, or hyperlipidemia although it is again noted that she had not seen a physician for many years.\par \par EKG from Dr. Grant's office from 2019 revealed sinus rhythm with normal axis increased voltage, poor R-wave progression, and a rate related left bundle branch block versus PVCs.\par \par An EKG performed  in this office reveals sinus rhythm with left bundle branch block.\par ===============================================================================\par She was referred for Lexiscan stress testing, due to LBBB:\par Lexiscan THALLIUM (Myoview not available, apparently): May 9, 2019\par Her were no symptoms and there were no diagnostic EKG changes.\par SPECT imaging revealed a small zone of moderately diminished tracer uptake in the anteroseptal segment. There may be mild improvement on rest imaging.\par Normal left size, wall motion, and ejection fraction of 79%.\par Also there is a mainly fixed mild anterior septal defect\par \par NB: Initial labs from Dr. Mo on 19 revealed an elevated Chol of 270, with LDL of 170 !!\par and HDL of 65, trig 140. He started her on Simvastatin 20 mg\par \par  Carotid u/s 21                                                    \par L: Mild Plaque in the bulb and prox ICA; less than 50%\par R: Mild to moderate plaque in the bulb and prox ICA, Less than 50%\par ===================================================================================\par ****Echocardiogram (2021):\par Normal LV size and contractility with an EF of 63%.\par Abnormal septal activation due to bundle branch block.\par Mild concentric LVH.\par Mild diastolic grade 1 dysfunction.\par Normal trileaflet aortic valve.\par Mild mitral annular calcification without MR or MS.\par Otherwise normal study.\par =======================================================================================\par Currently, she remains under treatment for:\par 1)  hypertension , using losartan--HCTZ  50/ 12.5  \par 2) hyperlipidemia, on simvastatin 20\par \par She continues to feel well. She is quite asymptomatic. Specifically, no cardiovascular symptoms such as chest discomfort, shortness of breath, dyspnea on exertion, orthopnea, or PND. She is taking and tolerating her medications. No new issues since last visit.

## 2022-02-11 LAB
ALBUMIN SERPL ELPH-MCNC: 4.7 G/DL
ALP BLD-CCNC: 124 U/L
ALT SERPL-CCNC: 20 U/L
ANION GAP SERPL CALC-SCNC: 25 MMOL/L
AST SERPL-CCNC: 17 U/L
BASOPHILS # BLD AUTO: 0.05 K/UL
BASOPHILS NFR BLD AUTO: 0.5 %
BILIRUB SERPL-MCNC: 0.4 MG/DL
BUN SERPL-MCNC: 14 MG/DL
CALCIUM SERPL-MCNC: 9.9 MG/DL
CHLORIDE SERPL-SCNC: 102 MMOL/L
CHOLEST SERPL-MCNC: 203 MG/DL
CO2 SERPL-SCNC: 16 MMOL/L
CREAT SERPL-MCNC: 0.59 MG/DL
EOSINOPHIL # BLD AUTO: 0.3 K/UL
EOSINOPHIL NFR BLD AUTO: 3.2 %
ESTIMATED AVERAGE GLUCOSE: 120 MG/DL
GLUCOSE SERPL-MCNC: 81 MG/DL
HBA1C MFR BLD HPLC: 5.8 %
HCT VFR BLD CALC: 51.9 %
HDLC SERPL-MCNC: 62 MG/DL
HGB BLD-MCNC: 16.3 G/DL
IMM GRANULOCYTES NFR BLD AUTO: 0.3 %
LDLC SERPL CALC-MCNC: 99 MG/DL
LYMPHOCYTES # BLD AUTO: 1.77 K/UL
LYMPHOCYTES NFR BLD AUTO: 18.6 %
MAN DIFF?: NORMAL
MCHC RBC-ENTMCNC: 30 PG
MCHC RBC-ENTMCNC: 31.4 GM/DL
MCV RBC AUTO: 95.4 FL
MONOCYTES # BLD AUTO: 0.55 K/UL
MONOCYTES NFR BLD AUTO: 5.8 %
NEUTROPHILS # BLD AUTO: 6.82 K/UL
NEUTROPHILS NFR BLD AUTO: 71.6 %
NONHDLC SERPL-MCNC: 142 MG/DL
PLATELET # BLD AUTO: 256 K/UL
POTASSIUM SERPL-SCNC: 5.5 MMOL/L
PROT SERPL-MCNC: 7.5 G/DL
RBC # BLD: 5.44 M/UL
RBC # FLD: 15.3 %
SODIUM SERPL-SCNC: 143 MMOL/L
TRIGL SERPL-MCNC: 212 MG/DL
WBC # FLD AUTO: 9.52 K/UL

## 2022-08-04 ENCOUNTER — APPOINTMENT (OUTPATIENT)
Dept: CARDIOLOGY | Facility: CLINIC | Age: 67
End: 2022-08-04

## 2022-08-11 ENCOUNTER — APPOINTMENT (OUTPATIENT)
Dept: CARDIOLOGY | Facility: CLINIC | Age: 67
End: 2022-08-11

## 2022-08-11 ENCOUNTER — NON-APPOINTMENT (OUTPATIENT)
Age: 67
End: 2022-08-11

## 2022-08-11 VITALS
HEART RATE: 80 BPM | WEIGHT: 152 LBS | SYSTOLIC BLOOD PRESSURE: 124 MMHG | RESPIRATION RATE: 16 BRPM | DIASTOLIC BLOOD PRESSURE: 70 MMHG | BODY MASS INDEX: 23.86 KG/M2 | HEIGHT: 67 IN | OXYGEN SATURATION: 96 % | TEMPERATURE: 97.2 F

## 2022-08-11 PROCEDURE — 99214 OFFICE O/P EST MOD 30 MIN: CPT

## 2022-08-11 PROCEDURE — 36415 COLL VENOUS BLD VENIPUNCTURE: CPT

## 2022-08-11 PROCEDURE — 99072 ADDL SUPL MATRL&STAF TM PHE: CPT

## 2022-08-11 PROCEDURE — 93000 ELECTROCARDIOGRAM COMPLETE: CPT

## 2022-08-11 NOTE — HISTORY OF PRESENT ILLNESS
[FreeTextEntry1] : The patient was initially referred by Dr. Guilherme Mo ([phone]) because of:\par 1) an abnormal EKG (LBBB) \par 20 Hypertension--on Losartan HCTZ 50/12.5\par 3) Hyperlipidemia--on Atorvastatin 20 mg\par 4) Asymptomatic Right Carotid stenosis--see below\par \par Scenario: The patient saw Dr. Mo who performed an EKG and informed her that it was abnormal she was referred here.\par \par The patient has no prior cardiac history. Specifically no history of heart attack or chest pains that she can recall. No significant shortness of breath or dyspnea on exertion. No history of heart murmur or rheumatic fever. No history of dizziness, lightheadedness, syncope, or palpitations. No recent fevers or chills. Her weight is stable.\par --Risk factors for CAD include the  following: Smoking (originally a pack and a half per day but now down to less than one pack) and positive family history (father  of an MI in his 60s).\par --There is no history of hypertension, diabetes, or hyperlipidemia although it is again noted that she had not seen a physician for many years.\par \par EKG from Dr. Grant's office from 2019 revealed sinus rhythm with normal axis increased voltage, poor R-wave progression, and a rate related left bundle branch block versus PVCs.\par \par An EKG performed  in this office reveals sinus rhythm with left bundle branch block.\par ===============================================================================\par She was referred for Lexiscan stress testing, due to LBBB:\par Lexiscan THALLIUM (Myoview not available, apparently): May 9, 2019\par Her were no symptoms and there were no diagnostic EKG changes.\par SPECT imaging revealed a small zone of moderately diminished tracer uptake in the anteroseptal segment. There may be mild improvement on rest imaging.\par Normal left size, wall motion, and ejection fraction of 79%.\par Also there is a mainly fixed mild anterior septal defect\par \par NB: Initial labs from Dr. Mo on 19 revealed an elevated Chol of 270, with LDL of 170 !!\par and HDL of 65, trig 140. He started her on Simvastatin 20 mg\par \par  Carotid u/s 21                                                    \par L: Mild Plaque in the bulb and prox ICA; less than 50%\par R: Mild to moderate plaque in the bulb and prox ICA, Less than 50%\par ===================================================================================\par ****Echocardiogram (2021):\par Normal LV size and contractility with an EF of 63%.\par Abnormal septal activation due to bundle branch block.\par Mild concentric LVH.\par Mild diastolic grade 1 dysfunction.\par Normal trileaflet aortic valve.\par Mild mitral annular calcification without MR or MS.\par Otherwise normal study.\par =======================================================================================\par Currently, she remains under treatment for:\par 1)  hypertension , using losartan--HCTZ  50/ 12.5  \par 2) hyperlipidemia, on simvastatin 20\par \par She continues to feel well. She is quite asymptomatic. Specifically, no cardiovascular symptoms such as chest discomfort, shortness of breath, dyspnea on exertion, orthopnea, or PND. She is taking and tolerating her medications. No new issues since last visit.

## 2022-08-12 LAB
ALBUMIN SERPL ELPH-MCNC: 4.6 G/DL
ALP BLD-CCNC: 111 U/L
ALT SERPL-CCNC: 22 U/L
ANION GAP SERPL CALC-SCNC: 15 MMOL/L
AST SERPL-CCNC: 18 U/L
BASOPHILS # BLD AUTO: 0.06 K/UL
BASOPHILS NFR BLD AUTO: 0.6 %
BILIRUB SERPL-MCNC: 0.3 MG/DL
BUN SERPL-MCNC: 13 MG/DL
CALCIUM SERPL-MCNC: 9.8 MG/DL
CHLORIDE SERPL-SCNC: 101 MMOL/L
CHOLEST SERPL-MCNC: 171 MG/DL
CO2 SERPL-SCNC: 25 MMOL/L
CREAT SERPL-MCNC: 0.64 MG/DL
EGFR: 98 ML/MIN/1.73M2
EOSINOPHIL # BLD AUTO: 0.29 K/UL
EOSINOPHIL NFR BLD AUTO: 2.9 %
ESTIMATED AVERAGE GLUCOSE: 120 MG/DL
GLUCOSE SERPL-MCNC: 87 MG/DL
HBA1C MFR BLD HPLC: 5.8 %
HCT VFR BLD CALC: 47.9 %
HDLC SERPL-MCNC: 75 MG/DL
HGB BLD-MCNC: 15.8 G/DL
IMM GRANULOCYTES NFR BLD AUTO: 0.3 %
LDLC SERPL CALC-MCNC: 73 MG/DL
LYMPHOCYTES # BLD AUTO: 2.43 K/UL
LYMPHOCYTES NFR BLD AUTO: 24.1 %
MAN DIFF?: NORMAL
MCHC RBC-ENTMCNC: 32.2 PG
MCHC RBC-ENTMCNC: 33 GM/DL
MCV RBC AUTO: 97.8 FL
MONOCYTES # BLD AUTO: 0.51 K/UL
MONOCYTES NFR BLD AUTO: 5 %
NEUTROPHILS # BLD AUTO: 6.78 K/UL
NEUTROPHILS NFR BLD AUTO: 67.1 %
NONHDLC SERPL-MCNC: 96 MG/DL
PLATELET # BLD AUTO: 241 K/UL
POTASSIUM SERPL-SCNC: 4.7 MMOL/L
PROT SERPL-MCNC: 7 G/DL
RBC # BLD: 4.9 M/UL
RBC # FLD: 15.3 %
SODIUM SERPL-SCNC: 141 MMOL/L
TRIGL SERPL-MCNC: 111 MG/DL
WBC # FLD AUTO: 10.1 K/UL

## 2023-02-08 ENCOUNTER — RX RENEWAL (OUTPATIENT)
Age: 68
End: 2023-02-08

## 2023-03-23 PROBLEM — I44.7 COMPLETE LEFT BUNDLE BRANCH BLOCK: Status: ACTIVE | Noted: 2019-04-25

## 2023-03-23 NOTE — HISTORY OF PRESENT ILLNESS
[FreeTextEntry1] : The patient was initially referred by Dr. Guilherme Mo ([phone]) because of:\par 1) an abnormal EKG (LBBB) \par 20 Hypertension--on Losartan HCTZ 50/12.5\par 3) Hyperlipidemia--on Atorvastatin 20 mg\par 4) Asymptomatic Right Carotid stenosis--see below\par \par Scenario: The patient saw Dr. Mo who performed an EKG and informed her that it was abnormal she was referred here.\par \par The patient has no prior cardiac history. Specifically no history of heart attack or chest pains that she can recall. No significant shortness of breath or dyspnea on exertion. No history of heart murmur or rheumatic fever. No history of dizziness, lightheadedness, syncope, or palpitations. No recent fevers or chills. Her weight is stable.\par --Risk factors for CAD include the  following: Smoking (originally a pack and a half per day but now down to less than one pack) and positive family history (father  of an MI in his 60s).\par --There is no history of hypertension, diabetes, or hyperlipidemia although it is again noted that she had not seen a physician for many years.\par \par EKG from Dr. Grant's office from 2019 revealed sinus rhythm with normal axis increased voltage, poor R-wave progression, and a rate related left bundle branch block versus PVCs.\par \par An EKG performed  in this office reveals sinus rhythm with left bundle branch block.\par ===============================================================================\par She was referred for Lexiscan stress testing, due to LBBB:\par Lexiscan THALLIUM (Myoview not available, apparently): May 9, 2019\par Her were no symptoms and there were no diagnostic EKG changes.\par SPECT imaging revealed a small zone of moderately diminished tracer uptake in the anteroseptal segment. There may be mild improvement on rest imaging.\par Normal left size, wall motion, and ejection fraction of 79%.\par Also there is a mainly fixed mild anterior septal defect\par \par NB: Initial labs from Dr. Mo on 19 revealed an elevated Chol of 270, with LDL of 170 !!\par and HDL of 65, trig 140. He started her on Simvastatin 20 mg\par \par  Carotid u/s 21                                                    \par L: Mild Plaque in the bulb and prox ICA; less than 50%\par R: Mild to moderate plaque in the bulb and prox ICA, Less than 50%\par ===================================================================================\par ****Echocardiogram (2021):\par Normal LV size and contractility with an EF of 63%.\par Abnormal septal activation due to bundle branch block.\par Mild concentric LVH.\par Mild diastolic grade 1 dysfunction.\par Normal trileaflet aortic valve.\par Mild mitral annular calcification without MR or MS.\par Otherwise normal study.\par =======================================================================================\par Currently, she remains under treatment for:\par 1)  hypertension , using losartan--HCTZ  50/ 12.5  \par 2) hyperlipidemia, on simvastatin 20\par \par She continues to feel well. She is quite asymptomatic. Specifically, no cardiovascular symptoms such as chest discomfort, shortness of breath, dyspnea on exertion, orthopnea, or PND. She is taking and tolerating her medications. No new issues since last visit.

## 2023-03-23 NOTE — PHYSICAL EXAM
[Well Developed] : well developed [Well Nourished] : well nourished [No Acute Distress] : no acute distress [Normal Venous Pressure] : normal venous pressure [No Carotid Bruit] : no carotid bruit [Normal S1, S2] : normal S1, S2 [No Rub] : no rub [No Gallop] : no gallop [Clear Lung Fields] : clear lung fields [Good Air Entry] : good air entry [No Respiratory Distress] : no respiratory distress  [Soft] : abdomen soft [Non Tender] : non-tender [No Masses/organomegaly] : no masses/organomegaly [Normal Bowel Sounds] : normal bowel sounds [Normal Gait] : normal gait [No Edema] : no edema [No Cyanosis] : no cyanosis [No Clubbing] : no clubbing [No Varicosities] : no varicosities [No Rash] : no rash [No Skin Lesions] : no skin lesions [Moves all extremities] : moves all extremities [No Focal Deficits] : no focal deficits [Normal Speech] : normal speech [Alert and Oriented] : alert and oriented [Normal memory] : normal memory [General Appearance - Well Developed] : well developed [Normal Appearance] : normal appearance [Well Groomed] : well groomed [General Appearance - Well Nourished] : well nourished [No Deformities] : no deformities [General Appearance - In No Acute Distress] : no acute distress [Normal Conjunctiva] : the conjunctiva exhibited no abnormalities [Eyelids - No Xanthelasma] : the eyelids demonstrated no xanthelasmas [Normal Oral Mucosa] : normal oral mucosa [No Oral Pallor] : no oral pallor [No Oral Cyanosis] : no oral cyanosis [Normal Jugular Venous A Waves Present] : normal jugular venous A waves present [Normal Jugular Venous V Waves Present] : normal jugular venous V waves present [No Jugular Venous Mac A Waves] : no jugular venous mac A waves [Respiration, Rhythm And Depth] : normal respiratory rhythm and effort [Exaggerated Use Of Accessory Muscles For Inspiration] : no accessory muscle use [Auscultation Breath Sounds / Voice Sounds] : lungs were clear to auscultation bilaterally [Abdomen Soft] : soft [Abdomen Tenderness] : non-tender [Abdomen Mass (___ Cm)] : no abdominal mass palpated [Gait - Sufficient For Exercise Testing] : the gait was sufficient for exercise testing [Abnormal Walk] : normal gait [Nail Clubbing] : no clubbing of the fingernails [Cyanosis, Localized] : no localized cyanosis [Petechial Hemorrhages (___cm)] : no petechial hemorrhages [Skin Color & Pigmentation] : normal skin color and pigmentation [] : no rash [No Venous Stasis] : no venous stasis [Skin Lesions] : no skin lesions [No Skin Ulcers] : no skin ulcer [No Xanthoma] : no  xanthoma was observed [Oriented To Time, Place, And Person] : oriented to person, place, and time [Affect] : the affect was normal [Mood] : the mood was normal [No Anxiety] : not feeling anxious [5th Left ICS - MCL] : palpated at the 5th LICS in the midclavicular line [Normal] : normal [Normal Rate] : normal [Rhythm Regular] : regular [Normal S1] : normal S1 [Normal S2] : normal S2 [No Murmur] : no murmurs heard [No Abnormalities] : the abdominal aorta was not enlarged and no bruit was heard [Right Carotid Bruit] : no bruit heard over the right carotid [Left Carotid Bruit] : no bruit heard over the left carotid [Right Femoral Bruit] : no bruit heard over the right femoral artery [Left Femoral Bruit] : no bruit heard over the left femoral artery [Rt] : no varicose veins of the right leg [Lt] : no varicose veins of the left leg

## 2023-03-30 ENCOUNTER — APPOINTMENT (OUTPATIENT)
Dept: CARDIOLOGY | Facility: CLINIC | Age: 68
End: 2023-03-30
Payer: COMMERCIAL

## 2023-03-30 DIAGNOSIS — I44.7 LEFT BUNDLE-BRANCH BLOCK, UNSPECIFIED: ICD-10-CM

## 2023-06-15 ENCOUNTER — NON-APPOINTMENT (OUTPATIENT)
Age: 68
End: 2023-06-15

## 2023-06-15 ENCOUNTER — APPOINTMENT (OUTPATIENT)
Dept: CARDIOLOGY | Facility: CLINIC | Age: 68
End: 2023-06-15
Payer: COMMERCIAL

## 2023-06-15 VITALS — WEIGHT: 152 LBS | OXYGEN SATURATION: 95 % | HEIGHT: 67 IN | BODY MASS INDEX: 23.86 KG/M2 | HEART RATE: 86 BPM

## 2023-06-15 VITALS — SYSTOLIC BLOOD PRESSURE: 110 MMHG | DIASTOLIC BLOOD PRESSURE: 60 MMHG

## 2023-06-15 PROCEDURE — 99214 OFFICE O/P EST MOD 30 MIN: CPT

## 2023-06-15 PROCEDURE — 36415 COLL VENOUS BLD VENIPUNCTURE: CPT

## 2023-06-15 PROCEDURE — 93000 ELECTROCARDIOGRAM COMPLETE: CPT

## 2023-06-15 NOTE — PHYSICAL EXAM
[Well Developed] : well developed [Well Nourished] : well nourished [No Acute Distress] : no acute distress [Normal Venous Pressure] : normal venous pressure [No Carotid Bruit] : no carotid bruit [Normal S1, S2] : normal S1, S2 [No Rub] : no rub [No Gallop] : no gallop [Clear Lung Fields] : clear lung fields [Good Air Entry] : good air entry [No Respiratory Distress] : no respiratory distress  [Soft] : abdomen soft [Non Tender] : non-tender [No Masses/organomegaly] : no masses/organomegaly [Normal Bowel Sounds] : normal bowel sounds [Normal Gait] : normal gait [No Edema] : no edema [No Cyanosis] : no cyanosis [No Clubbing] : no clubbing [No Varicosities] : no varicosities [No Rash] : no rash [No Skin Lesions] : no skin lesions [Moves all extremities] : moves all extremities [No Focal Deficits] : no focal deficits [Normal Speech] : normal speech [Alert and Oriented] : alert and oriented [Normal memory] : normal memory [General Appearance - Well Developed] : well developed [Normal Appearance] : normal appearance [Well Groomed] : well groomed [General Appearance - Well Nourished] : well nourished [No Deformities] : no deformities [General Appearance - In No Acute Distress] : no acute distress [Normal Conjunctiva] : the conjunctiva exhibited no abnormalities [Eyelids - No Xanthelasma] : the eyelids demonstrated no xanthelasmas [Normal Oral Mucosa] : normal oral mucosa [No Oral Pallor] : no oral pallor [No Oral Cyanosis] : no oral cyanosis [Normal Jugular Venous A Waves Present] : normal jugular venous A waves present [Normal Jugular Venous V Waves Present] : normal jugular venous V waves present [No Jugular Venous Mac A Waves] : no jugular venous mac A waves [Respiration, Rhythm And Depth] : normal respiratory rhythm and effort [Exaggerated Use Of Accessory Muscles For Inspiration] : no accessory muscle use [Auscultation Breath Sounds / Voice Sounds] : lungs were clear to auscultation bilaterally [Abdomen Soft] : soft [Abdomen Tenderness] : non-tender [Abdomen Mass (___ Cm)] : no abdominal mass palpated [Abnormal Walk] : normal gait [Gait - Sufficient For Exercise Testing] : the gait was sufficient for exercise testing [Nail Clubbing] : no clubbing of the fingernails [Cyanosis, Localized] : no localized cyanosis [Petechial Hemorrhages (___cm)] : no petechial hemorrhages [Skin Color & Pigmentation] : normal skin color and pigmentation [] : no rash [No Venous Stasis] : no venous stasis [No Skin Ulcers] : no skin ulcer [Skin Lesions] : no skin lesions [No Xanthoma] : no  xanthoma was observed [Oriented To Time, Place, And Person] : oriented to person, place, and time [Affect] : the affect was normal [Mood] : the mood was normal [No Anxiety] : not feeling anxious [5th Left ICS - MCL] : palpated at the 5th LICS in the midclavicular line [Normal] : normal [Normal Rate] : normal [Rhythm Regular] : regular [Normal S1] : normal S1 [Normal S2] : normal S2 [No Murmur] : no murmurs heard [No Abnormalities] : the abdominal aorta was not enlarged and no bruit was heard [Right Carotid Bruit] : no bruit heard over the right carotid [Left Carotid Bruit] : no bruit heard over the left carotid [Right Femoral Bruit] : no bruit heard over the right femoral artery [Left Femoral Bruit] : no bruit heard over the left femoral artery [Rt] : no varicose veins of the right leg [Lt] : no varicose veins of the left leg

## 2023-06-15 NOTE — ASSESSMENT
[FreeTextEntry1] : Guilherme:\par #1 Left bundle branch block. This is most likely idiopathic, or related to age as well as perhaps hypertension.\par \par 2) Low risk ETT--The stress test is borderline. SPECT imaging suggests a very small and mild zone of intraseptal ischemia. But polar maps show a fixed defect. Most likely these changes reflect the presence of left bundle-branch block and/or breast attenuation artifact, although I cannot exclude a small and mild degree of ischemia. In any case, this is certainly a very  low risk test suggesting that it is either normal or compatible with mild single-vessel disease. Will per [facility] medical therapy in this asymptomatic individual with borderline and low risk ETT. Management will be BP control, smoking cessation, statin therapy for lipids.\par \par #3) Carotid artery Dx, on ASA, statin. \par Carotid ultrasound of April 2021 reveals no progression of disease since the earlier study of 2017. Since the first study, she was placed on statin therapy and thus far there is no progression. Hence we'll continue medical therapy with aspirin and statin.\par \par #4)  essential hypertension --And blood pressure finally and nicely controlled on losartan hydrochlorothiazide. She is tolerating this nicely as well. Therefore we will continue with this regimen.\par #5 Mixed hyperlipidemia--now on Simvastatin, at goal\par #6  lifelong cigarette smoking and addiction--attempting to quit. Ominous that she still smokes. Discussed quitting techniques and options\par \par ***Consider follow-up carotid ultrasound to compare to 2021\par

## 2023-06-15 NOTE — HISTORY OF PRESENT ILLNESS
[FreeTextEntry1] : The patient was initially referred by Dr. Guilherme Mo ([phone]) because of:\par 1) an abnormal EKG (LBBB) \par 20 Hypertension--on Losartan HCTZ 50/12.5\par 3) Hyperlipidemia--on Atorvastatin 20 mg\par 4) Asymptomatic Right Carotid stenosis--see below\par \par Scenario: The patient saw Dr. Mo who performed an EKG and informed her that it was abnormal she was referred here.\par \par The patient has no prior cardiac history. Specifically no history of heart attack or chest pains that she can recall. No significant shortness of breath or dyspnea on exertion. No history of heart murmur or rheumatic fever. No history of dizziness, lightheadedness, syncope, or palpitations. No recent fevers or chills. Her weight is stable.\par --Risk factors for CAD include the  following: Smoking (originally a pack and a half per day but now down to less than one pack) and positive family history (father  of an MI in his 60s).\par --There is no history of hypertension, diabetes, or hyperlipidemia although it is again noted that she had not seen a physician for many years.\par \par EKG from Dr. Grant's office from 2019 revealed sinus rhythm with normal axis increased voltage, poor R-wave progression, and a rate related left bundle branch block versus PVCs.\par \par An EKG performed  in this office reveals sinus rhythm with left bundle branch block.\par ===============================================================================\par She was referred for Lexiscan stress testing, due to LBBB:\par Lexiscan THALLIUM (Myoview not available, apparently): May 9, 2019\par Her were no symptoms and there were no diagnostic EKG changes.\par SPECT imaging revealed a small zone of moderately diminished tracer uptake in the anteroseptal segment. There may be mild improvement on rest imaging.\par Normal left size, wall motion, and ejection fraction of 79%.\par Also there is a mainly fixed mild anterior septal defect\par \par NB: Initial labs from Dr. Mo on 19 revealed an elevated Chol of 270, with LDL of 170 !!\par and HDL of 65, trig 140. He started her on Simvastatin 20 mg\par \par  Carotid u/s 21                                                    \par L: Mild Plaque in the bulb and prox ICA; less than 50%\par R: Mild to moderate plaque in the bulb and prox ICA, Less than 50%\par ===================================================================================\par ****Echocardiogram (2021):\par Normal LV size and contractility with an EF of 63%.\par Abnormal septal activation due to bundle branch block.\par Mild concentric LVH.\par Mild diastolic grade 1 dysfunction.\par Normal trileaflet aortic valve.\par Mild mitral annular calcification without MR or MS.\par Otherwise normal study.\par =======================================================================================\par Currently, she remains under treatment for:\par 1)  hypertension , using losartan--HCTZ  50/ 12.5  \par 2) hyperlipidemia, on simvastatin 20\par \par She continues to feel well. She is quite asymptomatic. Specifically, no cardiovascular symptoms such as chest discomfort, shortness of breath, dyspnea on exertion, orthopnea, or PND. She is taking and tolerating her medications. No new issues since last visit.

## 2023-06-16 LAB
ALBUMIN SERPL ELPH-MCNC: 4.4 G/DL
ALP BLD-CCNC: 125 U/L
ALT SERPL-CCNC: 20 U/L
ANION GAP SERPL CALC-SCNC: 14 MMOL/L
AST SERPL-CCNC: 14 U/L
BILIRUB SERPL-MCNC: 0.3 MG/DL
BUN SERPL-MCNC: 14 MG/DL
CALCIUM SERPL-MCNC: 9.6 MG/DL
CHLORIDE SERPL-SCNC: 105 MMOL/L
CHOLEST SERPL-MCNC: 180 MG/DL
CO2 SERPL-SCNC: 25 MMOL/L
CREAT SERPL-MCNC: 0.6 MG/DL
EGFR: 99 ML/MIN/1.73M2
GLUCOSE SERPL-MCNC: 116 MG/DL
HDLC SERPL-MCNC: 75 MG/DL
LDLC SERPL CALC-MCNC: 82 MG/DL
NONHDLC SERPL-MCNC: 105 MG/DL
POTASSIUM SERPL-SCNC: 3.6 MMOL/L
PROT SERPL-MCNC: 6.9 G/DL
SODIUM SERPL-SCNC: 144 MMOL/L
TRIGL SERPL-MCNC: 112 MG/DL

## 2023-07-19 ENCOUNTER — RX RENEWAL (OUTPATIENT)
Age: 68
End: 2023-07-19

## 2023-07-19 RX ORDER — LOSARTAN POTASSIUM AND HYDROCHLOROTHIAZIDE 12.5; 5 MG/1; MG/1
50-12.5 TABLET ORAL DAILY
Qty: 90 | Refills: 3 | Status: ACTIVE | COMMUNITY
Start: 2019-06-06 | End: 1900-01-01

## 2023-09-10 NOTE — PAST MEDICAL HISTORY
[Postmenopausal] : The patient is postmenopausal [Menarche Age ____] : age at menarche was [unfilled] [Menopause Age____] : age at menopause was [unfilled] [History of Hormone Replacement Treatment] : has no history of hormone replacement treatment [Definite ___ (Date)] : the last menstrual period was [unfilled] [Total Preg ___] : G[unfilled] [Live Births ___] : P[unfilled]  [Age At Live Birth ___] : Age at live birth: [unfilled]

## 2023-09-10 NOTE — PHYSICAL EXAM
[Normocephalic] : normocephalic [Atraumatic] : atraumatic [EOMI] : extra ocular movement intact [Supple] : supple [No Supraclavicular Adenopathy] : no supraclavicular adenopathy [No Cervical Adenopathy] : no cervical adenopathy [Examined in the supine and seated position] : examined in the supine and seated position [No dominant masses] : no dominant masses in right breast  [No dominant masses] : no dominant masses left breast [No Nipple Retraction] : no left nipple retraction [No Nipple Discharge] : no left nipple discharge [Breast Mass Right Breast ___cm] : no masses [Breast Mass Left Breast ___cm] : no masses [Breast Nipple Inversion] : nipples not inverted [Breast Nipple Retraction] : nipples not retracted [Breast Nipple Flattening] : nipples not flattened [Breast Nipple Fissures] : nipples not fissured [Breast Abnormal Lactation (Galactorrhea)] : no galactorrhea [Breast Abnormal Secretion Bloody Fluid] : no bloody discharge [Breast Abnormal Secretion Serous Fluid] : no serous discharge [Breast Abnormal Secretion Opalescent Fluid] : no milky discharge [No Axillary Lymphadenopathy] : no left axillary lymphadenopathy [No Edema] : no edema [No Rashes] : no rashes [No Ulceration] : no ulceration [de-identified] : On exam, the patient has ptotic C-cup breasts.  She has a well-healed left breast periareolar incision.  On palpation, I cannot feel any suspicious densities in either breast.  She has no axillary, supraclavicular, or cervical adenopathy.

## 2023-09-10 NOTE — ASSESSMENT
[FreeTextEntry1] : The patient is a 67-year-old G4, P4 postmenopausal white female with [place], [place], and [place] descent.  She underwent menarche at age 14 and had her first child at age 23.  She underwent menopause at age 52 and never took any hormone replacement therapy.  She has no family history of breast or ovarian cancer.  Her mother had renal cell carcinoma at age 34 and later passed away from metastatic disease to the lung.  Maternal aunt had AML at age 74.  Her sister had liver and bile duct cancer and passed away at age 51.  Her maternal cousin had a glioblastoma at age 51.  The patient developed a left breast mass around April 2019 and mammography and ultrasound on May 21 and 22, 2019 showed a left breast 9:00 well defined density which on ultrasound corresponded to a 1.4 cm mass.  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  She underwent her last bilateral mammography and ultrasound which was reviewed from ?????? November 8, 2021 performed at [facility] which showed no suspicious findings.  On exam today, I cannot feel any suspicious densities in either breast. the patient was reassured and should continue yearly follow-up again in September 2024.  Her next bilateral mammography and ultrasound will be due in ???????? November 2023 and she was given prescriptions.

## 2023-09-10 NOTE — HISTORY OF PRESENT ILLNESS
[FreeTextEntry1] : The patient is a 67-year-old G4, P4 postmenopausal white female with [place], English, and [place] descent.  She underwent menarche at age 14 and had her first child at age 23.  She underwent menopause at age 52 and never took any hormone replacement therapy.  She has no family history of breast or ovarian cancer.  Her mother had renal cell carcinoma at age 34 and later passed away from metastatic disease to the lung.  Maternal aunt had AML at age 74.  Her sister had liver and bile duct cancer and passed away at age 51.  Her maternal cousin had a glioblastoma at age 51.  The patient developed a left breast mass around April 2019 and mammography and ultrasound on May 21 and 22, 2019 showed a left breast 9:00 well defined density which on ultrasound corresponded to a 1.4 cm mass.  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  She comes in for routine yearly follow-up and continues to get yearly mammography and ultrasound.

## 2023-09-15 ENCOUNTER — NON-APPOINTMENT (OUTPATIENT)
Age: 68
End: 2023-09-15

## 2023-09-19 ENCOUNTER — APPOINTMENT (OUTPATIENT)
Dept: BREAST CENTER | Facility: CLINIC | Age: 68
End: 2023-09-19
Payer: COMMERCIAL

## 2023-09-19 ENCOUNTER — NON-APPOINTMENT (OUTPATIENT)
Age: 68
End: 2023-09-19

## 2023-09-19 VITALS — WEIGHT: 152 LBS | BODY MASS INDEX: 23.86 KG/M2 | HEIGHT: 67 IN

## 2023-09-19 DIAGNOSIS — R92.2 INCONCLUSIVE MAMMOGRAM: ICD-10-CM

## 2023-09-19 DIAGNOSIS — Z12.39 ENCOUNTER FOR OTHER SCREENING FOR MALIGNANT NEOPLASM OF BREAST: ICD-10-CM

## 2023-09-19 PROCEDURE — 99213 OFFICE O/P EST LOW 20 MIN: CPT

## 2023-09-27 ENCOUNTER — APPOINTMENT (OUTPATIENT)
Dept: VASCULAR SURGERY | Facility: CLINIC | Age: 68
End: 2023-09-27

## 2023-12-14 PROBLEM — R94.31 ABNORMAL ECG: Status: ACTIVE | Noted: 2021-10-07

## 2023-12-14 PROBLEM — E78.2 MIXED HYPERLIPIDEMIA: Status: ACTIVE | Noted: 2019-06-06

## 2023-12-14 NOTE — ASSESSMENT
[FreeTextEntry1] : Assessment Hypertension (401.9) (I10) Dyslipidemia (272.4) (E78.5) Complete left bundle branch block (426.3) (I44.7) Smoker (305.1) (F17.200) Abnormal ECG (794.31) (R94.31) Carotid artery disease (447.9) (I77.9) Guilherme:  #1 Left bundle branch block. This is most likely idiopathic, or related to age as well as perhaps hypertension.  2) Low risk ETT--The stress test is borderline. SPECT imaging suggests a very small and mild zone of intraseptal ischemia. But polar maps show a fixed defect. Most likely these changes reflect the presence of left bundle-branch block and/or breast attenuation artifact, although I cannot exclude a small and mild degree of ischemia. In any case, this is certainly a very low risk test suggesting that it is either normal or compatible with mild single-vessel disease. Will per [facility] medical therapy in this asymptomatic individual with borderline and low risk ETT. Management will be BP control, smoking cessation, statin therapy for lipids. #3) Carotid artery Dx, on ASA, statin. -Carotid ultrasound of April 2021 reveals no progression of disease since the earlier study of 2017. Since the first study, she was placed on statin therapy and thus far there is no progression. Hence we'll continue medical therapy with aspirin and statin.  #4) essential hypertension --And blood pressure finally and nicely controlled on losartan hydrochlorothiazide. She is tolerating this nicely as well. Therefore we will continue with this regimen.  #5 Mixed hyperlipidemia--now on Simvastatin, at goal  #6 lifelong cigarette smoking and addiction--attempting to quit. Ominous that she still smokes. Discussed quitting techniques and options  ***Schedule follow-up carotid ultrasound to compare to 2021 .

## 2023-12-14 NOTE — HISTORY OF PRESENT ILLNESS
[FreeTextEntry1] : The patient was initially referred by Dr. Guilherme Mo ([phone]) because of:\par  1) an abnormal EKG (LBBB) \par  20 Hypertension--on Losartan HCTZ 50/12.5\par  3) Hyperlipidemia--on Atorvastatin 20 mg\par  4) Asymptomatic Right Carotid stenosis--see below\par  \par  Scenario: The patient saw Dr. Mo who performed an EKG and informed her that it was abnormal she was referred here.\par  \par  The patient has no prior cardiac history. Specifically no history of heart attack or chest pains that she can recall. No significant shortness of breath or dyspnea on exertion. No history of heart murmur or rheumatic fever. No history of dizziness, lightheadedness, syncope, or palpitations. No recent fevers or chills. Her weight is stable.\par  --Risk factors for CAD include the  following: Smoking (originally a pack and a half per day but now down to less than one pack) and positive family history (father  of an MI in his 60s).\par  --There is no history of hypertension, diabetes, or hyperlipidemia although it is again noted that she had not seen a physician for many years.\par  \par  EKG from Dr. Grant's office from 2019 revealed sinus rhythm with normal axis increased voltage, poor R-wave progression, and a rate related left bundle branch block versus PVCs.\par  \par  An EKG performed  in this office reveals sinus rhythm with left bundle branch block.\par  ===============================================================================\par  She was referred for Lexiscan stress testing, due to LBBB:\par  Lexiscan THALLIUM (Myoview not available, apparently): May 9, 2019\par  Her were no symptoms and there were no diagnostic EKG changes.\par  SPECT imaging revealed a small zone of moderately diminished tracer uptake in the anteroseptal segment. There may be mild improvement on rest imaging.\par  Normal left size, wall motion, and ejection fraction of 79%.\par  Also there is a mainly fixed mild anterior septal defect\par  \par  NB: Initial labs from Dr. Mo on 19 revealed an elevated Chol of 270, with LDL of 170 !!\par  and HDL of 65, trig 140. He started her on Simvastatin 20 mg\par  \par   Carotid u/s 21                                                    \par  L: Mild Plaque in the bulb and prox ICA; less than 50%\par  R: Mild to moderate plaque in the bulb and prox ICA, Less than 50%\par  ===================================================================================\par  ****Echocardiogram (2021):\par  Normal LV size and contractility with an EF of 63%.\par  Abnormal septal activation due to bundle branch block.\par  Mild concentric LVH.\par  Mild diastolic grade 1 dysfunction.\par  Normal trileaflet aortic valve.\par  Mild mitral annular calcification without MR or MS.\par  Otherwise normal study.\par  =======================================================================================\par  Currently, she remains under treatment for:\par  1)  hypertension , using losartan--HCTZ  50/ 12.5  \par  2) hyperlipidemia, on simvastatin 20\par  \par  She continues to feel well. She is quite asymptomatic. Specifically, no cardiovascular symptoms such as chest discomfort, shortness of breath, dyspnea on exertion, orthopnea, or PND. She is taking and tolerating her medications. No new issues since last visit.

## 2023-12-14 NOTE — PHYSICAL EXAM
[Well Developed] : well developed [Well Nourished] : well nourished [No Acute Distress] : no acute distress [Normal Venous Pressure] : normal venous pressure [No Carotid Bruit] : no carotid bruit [Normal S1, S2] : normal S1, S2 [No Rub] : no rub [No Gallop] : no gallop [Clear Lung Fields] : clear lung fields [Good Air Entry] : good air entry [No Respiratory Distress] : no respiratory distress  [Soft] : abdomen soft [Non Tender] : non-tender [No Masses/organomegaly] : no masses/organomegaly [Normal Bowel Sounds] : normal bowel sounds [Normal Gait] : normal gait [No Edema] : no edema [No Cyanosis] : no cyanosis [No Clubbing] : no clubbing [No Varicosities] : no varicosities [No Rash] : no rash [No Skin Lesions] : no skin lesions [Moves all extremities] : moves all extremities [No Focal Deficits] : no focal deficits [Normal Speech] : normal speech [Alert and Oriented] : alert and oriented [Normal memory] : normal memory [General Appearance - Well Developed] : well developed [Normal Appearance] : normal appearance [Well Groomed] : well groomed [General Appearance - Well Nourished] : well nourished [No Deformities] : no deformities [General Appearance - In No Acute Distress] : no acute distress [Normal Conjunctiva] : the conjunctiva exhibited no abnormalities [Eyelids - No Xanthelasma] : the eyelids demonstrated no xanthelasmas [Normal Oral Mucosa] : normal oral mucosa [No Oral Pallor] : no oral pallor [No Oral Cyanosis] : no oral cyanosis [Normal Jugular Venous A Waves Present] : normal jugular venous A waves present [Normal Jugular Venous V Waves Present] : normal jugular venous V waves present [No Jugular Venous Mac A Waves] : no jugular venous mac A waves [Respiration, Rhythm And Depth] : normal respiratory rhythm and effort [Exaggerated Use Of Accessory Muscles For Inspiration] : no accessory muscle use [Auscultation Breath Sounds / Voice Sounds] : lungs were clear to auscultation bilaterally [Abdomen Soft] : soft [Abdomen Tenderness] : non-tender [Abdomen Mass (___ Cm)] : no abdominal mass palpated [Abnormal Walk] : normal gait [Gait - Sufficient For Exercise Testing] : the gait was sufficient for exercise testing [Nail Clubbing] : no clubbing of the fingernails [Cyanosis, Localized] : no localized cyanosis [Petechial Hemorrhages (___cm)] : no petechial hemorrhages [Skin Color & Pigmentation] : normal skin color and pigmentation [] : no rash [No Venous Stasis] : no venous stasis [Skin Lesions] : no skin lesions [No Skin Ulcers] : no skin ulcer [No Xanthoma] : no  xanthoma was observed [Oriented To Time, Place, And Person] : oriented to person, place, and time [Affect] : the affect was normal [Mood] : the mood was normal [No Anxiety] : not feeling anxious [5th Left ICS - MCL] : palpated at the 5th LICS in the midclavicular line [Normal] : normal [Normal Rate] : normal [Rhythm Regular] : regular [Normal S1] : normal S1 [Normal S2] : normal S2 [No Murmur] : no murmurs heard [Right Carotid Bruit] : no bruit heard over the right carotid [Left Carotid Bruit] : no bruit heard over the left carotid [Right Femoral Bruit] : no bruit heard over the right femoral artery [Left Femoral Bruit] : no bruit heard over the left femoral artery [No Abnormalities] : the abdominal aorta was not enlarged and no bruit was heard [Rt] : no varicose veins of the right leg [Lt] : no varicose veins of the left leg

## 2023-12-21 ENCOUNTER — APPOINTMENT (OUTPATIENT)
Dept: CARDIOLOGY | Facility: CLINIC | Age: 68
End: 2023-12-21

## 2023-12-21 DIAGNOSIS — R94.31 ABNORMAL ELECTROCARDIOGRAM [ECG] [EKG]: ICD-10-CM

## 2023-12-21 DIAGNOSIS — E78.2 MIXED HYPERLIPIDEMIA: ICD-10-CM

## 2024-01-18 PROBLEM — I21.9 MYOCARDIAL INFARCTION, UNSPECIFIED MI TYPE, UNSPECIFIED ARTERY: Status: ACTIVE | Noted: 2024-01-18

## 2024-01-18 NOTE — HISTORY OF PRESENT ILLNESS
[FreeTextEntry1] : The patient was initially referred by Dr. Guilherme Mo ([phone]) because of: 1) an abnormal EKG (LBBB)  20 Hypertension--on Losartan HCTZ 50/12.5 3) Hyperlipidemia--on Atorvastatin 20 mg 4) Asymptomatic Right Carotid stenosis--see below  Scenario: The patient saw Dr. Mo who performed an EKG and informed her that it was abnormal she was referred here.  The patient has no prior cardiac history. Specifically no history of heart attack or chest pains that she can recall. No significant shortness of breath or dyspnea on exertion. No history of heart murmur or rheumatic fever. No history of dizziness, lightheadedness, syncope, or palpitations. No recent fevers or chills. Her weight is stable. --Risk factors for CAD include the  following: Smoking (originally a pack and a half per day but now down to less than one pack) and positive family history (father  of an MI in his 60s). --There is no history of hypertension, diabetes, or hyperlipidemia although it is again noted that she had not seen a physician for many years.  EKG from Dr. Grant's office from 2019 revealed sinus rhythm with normal axis increased voltage, poor R-wave progression, and a rate related left bundle branch block versus PVCs.  An EKG performed  in this office reveals sinus rhythm with left bundle branch block. =============================================================================== She was referred for Lexiscan stress testing, due to LBBB: Lexiscan THALLIUM (Myoview not available, apparently): May 9, 2019 Her were no symptoms and there were no diagnostic EKG changes. SPECT imaging revealed a small zone of moderately diminished tracer uptake in the anteroseptal segment. There may be mild improvement on rest imaging. Normal left size, wall motion, and ejection fraction of 79%. Also there is a mainly fixed mild anterior septal defect ================================================================================  Carotid u/s 21                                                     L: Mild Plaque in the bulb and prox ICA; less than 50% R: Mild to moderate plaque in the bulb and prox ICA, Less than 50% =================================================================================== ****Echocardiogram (2021): Normal LV size and contractility with an EF of 63%. Abnormal septal activation due to bundle branch block. Mild concentric LVH. Mild diastolic grade 1 dysfunction. Normal trileaflet aortic valve. Mild mitral annular calcification without MR or MS. Otherwise normal study. ======================================================================================= Currently, she remains under treatment for: 1)  hypertension , using losartan--HCTZ  50/ 12.5   2) hyperlipidemia, on simvastatin 20  RECENT SCENARIO:

## 2024-01-18 NOTE — REASON FOR VISIT
[FreeTextEntry1] :  1)Essential Hypertension--on losartan--HCTZ 2)Hyperlipidemia--on simvastatin 3) Hx of "Abnormal EKG"--LBBB 4) Asymptomatic Right Carotid Stenosis  NEW: Recent Hospitalization at [facility] for NSTEMI and PPM

## 2024-01-25 ENCOUNTER — APPOINTMENT (OUTPATIENT)
Dept: CARDIOLOGY | Facility: CLINIC | Age: 69
End: 2024-01-25
Payer: COMMERCIAL

## 2024-01-25 ENCOUNTER — NON-APPOINTMENT (OUTPATIENT)
Age: 69
End: 2024-01-25

## 2024-01-25 VITALS
HEIGHT: 67 IN | WEIGHT: 152 LBS | BODY MASS INDEX: 23.86 KG/M2 | DIASTOLIC BLOOD PRESSURE: 62 MMHG | OXYGEN SATURATION: 97 % | SYSTOLIC BLOOD PRESSURE: 100 MMHG | HEART RATE: 72 BPM

## 2024-01-25 DIAGNOSIS — I21.9 ACUTE MYOCARDIAL INFARCTION, UNSPECIFIED: ICD-10-CM

## 2024-01-25 PROCEDURE — 36415 COLL VENOUS BLD VENIPUNCTURE: CPT

## 2024-01-25 PROCEDURE — 99214 OFFICE O/P EST MOD 30 MIN: CPT

## 2024-01-25 RX ORDER — PANTOPRAZOLE 40 MG/1
40 TABLET, DELAYED RELEASE ORAL
Refills: 0 | Status: ACTIVE | COMMUNITY
Start: 2024-01-25

## 2024-01-25 RX ORDER — MECLIZINE HYDROCHLORIDE 12.5 MG/1
12.5 TABLET ORAL
Refills: 0 | Status: ACTIVE | COMMUNITY
Start: 2024-01-25

## 2024-01-26 LAB
ALBUMIN SERPL ELPH-MCNC: 4.5 G/DL
ALP BLD-CCNC: 144 U/L
ALT SERPL-CCNC: 30 U/L
ANION GAP SERPL CALC-SCNC: 12 MMOL/L
AST SERPL-CCNC: 22 U/L
BILIRUB SERPL-MCNC: 0.4 MG/DL
BUN SERPL-MCNC: 15 MG/DL
CALCIUM SERPL-MCNC: 9.8 MG/DL
CHLORIDE SERPL-SCNC: 102 MMOL/L
CHOLEST SERPL-MCNC: 184 MG/DL
CO2 SERPL-SCNC: 27 MMOL/L
CREAT SERPL-MCNC: 0.52 MG/DL
EGFR: 102 ML/MIN/1.73M2
GLUCOSE SERPL-MCNC: 110 MG/DL
HCT VFR BLD CALC: 46.2 %
HDLC SERPL-MCNC: 61 MG/DL
HGB BLD-MCNC: 14.6 G/DL
LDLC SERPL CALC-MCNC: 88 MG/DL
MCHC RBC-ENTMCNC: 30.2 PG
MCHC RBC-ENTMCNC: 31.6 GM/DL
MCV RBC AUTO: 95.7 FL
NONHDLC SERPL-MCNC: 124 MG/DL
PLATELET # BLD AUTO: 274 K/UL
POTASSIUM SERPL-SCNC: 4.4 MMOL/L
PROT SERPL-MCNC: 6.7 G/DL
RBC # BLD: 4.83 M/UL
RBC # FLD: 14.4 %
SODIUM SERPL-SCNC: 141 MMOL/L
TRIGL SERPL-MCNC: 215 MG/DL
WBC # FLD AUTO: 8.9 K/UL

## 2024-02-15 PROBLEM — Z00.00 ENCOUNTER FOR PREVENTIVE HEALTH EXAMINATION: Status: ACTIVE | Noted: 2019-04-23

## 2024-02-15 PROBLEM — N60.12 FIBROCYSTIC DISEASE OF BOTH BREASTS: Status: ACTIVE | Noted: 2021-12-07

## 2024-02-15 PROBLEM — F17.200 SMOKER: Status: ACTIVE | Noted: 2019-04-25

## 2024-02-22 ENCOUNTER — APPOINTMENT (OUTPATIENT)
Dept: CARDIOLOGY | Facility: CLINIC | Age: 69
End: 2024-02-22
Payer: COMMERCIAL

## 2024-02-22 VITALS
HEIGHT: 67 IN | DIASTOLIC BLOOD PRESSURE: 52 MMHG | OXYGEN SATURATION: 96 % | BODY MASS INDEX: 23.86 KG/M2 | WEIGHT: 152 LBS | SYSTOLIC BLOOD PRESSURE: 90 MMHG | HEART RATE: 86 BPM

## 2024-02-22 DIAGNOSIS — F17.200 NICOTINE DEPENDENCE, UNSPECIFIED, UNCOMPLICATED: ICD-10-CM

## 2024-02-22 DIAGNOSIS — N60.11 DIFFUSE CYSTIC MASTOPATHY OF LEFT BREAST: ICD-10-CM

## 2024-02-22 DIAGNOSIS — N60.12 DIFFUSE CYSTIC MASTOPATHY OF LEFT BREAST: ICD-10-CM

## 2024-02-22 DIAGNOSIS — Z00.00 ENCOUNTER FOR GENERAL ADULT MEDICAL EXAMINATION W/OUT ABNORMAL FINDINGS: ICD-10-CM

## 2024-02-22 PROCEDURE — 99214 OFFICE O/P EST MOD 30 MIN: CPT

## 2024-02-22 PROCEDURE — 99213 OFFICE O/P EST LOW 20 MIN: CPT

## 2024-02-22 NOTE — HISTORY OF PRESENT ILLNESS
[FreeTextEntry1] : The patient was initially referred by Dr. Guilherme Mo ([phone]) because of: 1) an abnormal EKG (LBBB)  20 Hypertension--on Losartan HCTZ 50/12.5 3) Hyperlipidemia--on Atorvastatin 20 mg 4) Asymptomatic Right Carotid stenosis--see below  Scenario: The patient saw Dr. Mo who performed an EKG and informed her that it was abnormal she was referred here.  The patient has no prior cardiac history. Specifically no history of heart attack or chest pains that she can recall. No significant shortness of breath or dyspnea on exertion. No history of heart murmur or rheumatic fever. No history of dizziness, lightheadedness, syncope, or palpitations. No recent fevers or chills. Her weight is stable. --Risk factors for CAD include the  following: Smoking (originally a pack and a half per day but now down to less than one pack) and positive family history (father  of an MI in his 60s). --There is no history of hypertension, diabetes, or hyperlipidemia although it is again noted that she had not seen a physician for many years.  EKG from Dr. Grant's office from 2019 revealed sinus rhythm with normal axis increased voltage, poor R-wave progression, and a rate related left bundle branch block versus PVCs.  An EKG performed in this office reveals sinus rhythm with left bundle branch block. =============================================================================== She was referred for Monae scan stress testing, due to LBBB: Lexiscan THALLIUM (Myoview not available, apparently): May 9, 2019 Her were no symptoms and there were no diagnostic EKG changes. SPECT imaging revealed a small zone of moderately diminished tracer uptake in the anteroseptal segment. There may be mild improvement on rest imaging. Normal left size, wall motion, and ejection fraction of 79%. Also there is a mainly fixed mild anterior septal defect ================================================================================  Carotid u/s 21                                                     L: Mild Plaque in the bulb and prox ICA; less than 50% R: Mild to moderate plaque in the bulb and prox ICA, Less than 50% =================================================================================== ****Echocardiogram (2021): Normal LV size and contractility with an EF of 63%. Abnormal septal activation due to bundle branch block. Mild concentric LVH. Mild diastolic grade 1 dysfunction. Normal Tri leaflet aortic valve. Mild mitral annular calcification without MR or MS. Otherwise, normal study. ======================================================================================= Currently, she remains under treatment for: 1)  hypertension , using losartan--HCTZ  50/ 12.5   2) hyperlipidemia, on simvastatin 20  RECENT SCENARIO: She was hospitalized 2024 after suffering syncope at home.   She regained consciousness spontaneously and called 911.  She was brought to [facility] where an EKG revealed TYPE II second degree heart block. There were also periods of NSVT. A temporary pacemaker was placed. *** Cardiac catheterization revealed: RCA: Total occlusion with L to R collaterals from LAD, filling PDA LAD: Nonobstructive CAD L Main--nl LCX_80% mid lesion  Medical therapy was elected, due to head trauma, with plan to re-evaluate after recovery;  When she fell, she hit her head.  A CT was negative for any hemorrhage.  She then underwent implantation of a dual-chamber pacemaker with Dr. Guzman.  She is scheduled to follow-up. She is currently feeling well.  Slowly getting back to normal.  No anginal chest pains or shortness of breath.  No dizziness or lightheadedness.  No fevers or chills.

## 2024-02-22 NOTE — REASON FOR VISIT
[FreeTextEntry1] : 1)Essential Hypertension--on losartan--HCTZ 2)Hyperlipidemia--on simvastatin 3) Hx of "Abnormal EKG"--LBBB 4) Asymptomatic Right Carotid Stenosis  NEW: Recent Hospitalization at [facility] for Syncope due to CHB Jan 5, 2024

## 2024-02-22 NOTE — ASSESSMENT
[FreeTextEntry1] : Assessment Hypertension (401.9) (I10) Dyslipidemia (272.4) (E78.5) Complete left bundle branch block (426.3) (I44.7) Smoker (305.1) (F17.200) Abnormal ECG (794.31) (R94.31) Carotid artery disease (447.9) (I77.9) Assessment Coronary artery disease with stable angina pectoris (414.00,413.9) (I25.118) Hypertension (401.9) (I10) Dyslipidemia (272.4) (E78.5) Aortic stenosis (424.1) (I35.0) Encounter for preventive health examination (V70.0) (Z00.00)   1.  Status post a hospitalization for syncope and collapse January 5, 2024 due to complete heart block.  Status post temporary then permanent pacemaker implant.  Current pacemaker appears to be functioning well.  She will follow-up with the implanting surgeon today. 2.)  CAD--cardiac catheterization apparently revealed     Two-vessel coronary artery disease with an occluded right coronary artery and 80% left circumflex.  Fortunately, the LAD has only mild disease and LV function is normal.  Probably little gained by bypassing the right since it is already receiving collaterals from the LAD.  Not clear whether there should be stenting of the LCx.  Will review with the staff at [facility].  Medical therapy for now during recovery.  She is asymptomatic.  #3) Carotid artery Dx, on ASA, statin. -Carotid ultrasound of April 2021 reveals no progression of disease since the earlier study of 2017. Since the first study, she was placed on statin therapy and thus far there is no progression. Hence we'll continue medical therapy with aspirin and statin.  #4) essential hypertension --And blood pressure finally and nicely controlled on losartan hydrochlorothiazide. She is tolerating this nicely as well. Therefore we will continue with this regimen.  #5 Mixed hyperlipidemia--now on Simvastatin, at goal  #6 lifelong cigarette smoking and addiction--attempting to quit. Ominous that she still smokes. Discussed quitting techniques and options  ***Schedule follow-up carotid ultrasound to compare to 2021 .

## 2024-02-22 NOTE — PHYSICAL EXAM
[Well Developed] : well developed [Well Nourished] : well nourished [No Acute Distress] : no acute distress [Normal Venous Pressure] : normal venous pressure [No Carotid Bruit] : no carotid bruit [Normal S1, S2] : normal S1, S2 [No Rub] : no rub [No Gallop] : no gallop [Clear Lung Fields] : clear lung fields [Good Air Entry] : good air entry [No Respiratory Distress] : no respiratory distress  [Soft] : abdomen soft [Non Tender] : non-tender [No Masses/organomegaly] : no masses/organomegaly [Normal Bowel Sounds] : normal bowel sounds [Normal Gait] : normal gait [No Edema] : no edema [No Cyanosis] : no cyanosis [No Clubbing] : no clubbing [No Varicosities] : no varicosities [No Rash] : no rash [No Skin Lesions] : no skin lesions [Moves all extremities] : moves all extremities [No Focal Deficits] : no focal deficits [Normal Speech] : normal speech [Alert and Oriented] : alert and oriented [Normal memory] : normal memory [General Appearance - Well Developed] : well developed [Normal Appearance] : normal appearance [Well Groomed] : well groomed [General Appearance - Well Nourished] : well nourished [No Deformities] : no deformities [General Appearance - In No Acute Distress] : no acute distress [Normal Conjunctiva] : the conjunctiva exhibited no abnormalities [Eyelids - No Xanthelasma] : the eyelids demonstrated no xanthelasmas [Normal Oral Mucosa] : normal oral mucosa [No Oral Pallor] : no oral pallor [No Oral Cyanosis] : no oral cyanosis [Normal Jugular Venous A Waves Present] : normal jugular venous A waves present [Normal Jugular Venous V Waves Present] : normal jugular venous V waves present [No Jugular Venous Mac A Waves] : no jugular venous mac A waves [Respiration, Rhythm And Depth] : normal respiratory rhythm and effort [Exaggerated Use Of Accessory Muscles For Inspiration] : no accessory muscle use [Auscultation Breath Sounds / Voice Sounds] : lungs were clear to auscultation bilaterally [Abdomen Soft] : soft [Abdomen Tenderness] : non-tender [Abdomen Mass (___ Cm)] : no abdominal mass palpated [Abnormal Walk] : normal gait [Gait - Sufficient For Exercise Testing] : the gait was sufficient for exercise testing [Nail Clubbing] : no clubbing of the fingernails [Cyanosis, Localized] : no localized cyanosis [Petechial Hemorrhages (___cm)] : no petechial hemorrhages [] : no rash [Skin Color & Pigmentation] : normal skin color and pigmentation [No Venous Stasis] : no venous stasis [Skin Lesions] : no skin lesions [No Skin Ulcers] : no skin ulcer [No Xanthoma] : no  xanthoma was observed [Oriented To Time, Place, And Person] : oriented to person, place, and time [Affect] : the affect was normal [Mood] : the mood was normal [No Anxiety] : not feeling anxious [5th Left ICS - MCL] : palpated at the 5th LICS in the midclavicular line [Normal] : normal [Normal Rate] : normal [Rhythm Regular] : regular [Normal S1] : normal S1 [Normal S2] : normal S2 [No Murmur] : no murmurs heard [No Abnormalities] : the abdominal aorta was not enlarged and no bruit was heard [Right Carotid Bruit] : no bruit heard over the right carotid [Left Carotid Bruit] : no bruit heard over the left carotid [Right Femoral Bruit] : no bruit heard over the right femoral artery [Left Femoral Bruit] : no bruit heard over the left femoral artery [Rt] : no varicose veins of the right leg [Lt] : no varicose veins of the left leg

## 2024-03-21 PROBLEM — I45.4 BUNDLE-BRANCH BLOCK: Status: ACTIVE | Noted: 2020-01-16

## 2024-03-21 PROBLEM — I67.9 CEREBROVASCULAR DISEASE: Status: ACTIVE | Noted: 2022-02-03

## 2024-03-21 PROBLEM — I44.7 COMPLETE LEFT BUNDLE BRANCH BLOCK: Status: ACTIVE | Noted: 2019-11-14

## 2024-03-22 ENCOUNTER — RX RENEWAL (OUTPATIENT)
Age: 69
End: 2024-03-22

## 2024-03-26 ENCOUNTER — RX RENEWAL (OUTPATIENT)
Age: 69
End: 2024-03-26

## 2024-03-28 ENCOUNTER — APPOINTMENT (OUTPATIENT)
Dept: CARDIOLOGY | Facility: CLINIC | Age: 69
End: 2024-03-28
Payer: COMMERCIAL

## 2024-03-28 ENCOUNTER — NON-APPOINTMENT (OUTPATIENT)
Age: 69
End: 2024-03-28

## 2024-03-28 VITALS
DIASTOLIC BLOOD PRESSURE: 70 MMHG | SYSTOLIC BLOOD PRESSURE: 108 MMHG | WEIGHT: 152 LBS | BODY MASS INDEX: 23.86 KG/M2 | HEIGHT: 67 IN

## 2024-03-28 DIAGNOSIS — I67.9 CEREBROVASCULAR DISEASE, UNSPECIFIED: ICD-10-CM

## 2024-03-28 DIAGNOSIS — I44.7 LEFT BUNDLE-BRANCH BLOCK, UNSPECIFIED: ICD-10-CM

## 2024-03-28 DIAGNOSIS — I45.4 NONSPECIFIC INTRAVENTRICULAR BLOCK: ICD-10-CM

## 2024-03-28 PROCEDURE — 93000 ELECTROCARDIOGRAM COMPLETE: CPT

## 2024-03-28 PROCEDURE — 99214 OFFICE O/P EST MOD 30 MIN: CPT

## 2024-03-28 PROCEDURE — 36415 COLL VENOUS BLD VENIPUNCTURE: CPT

## 2024-03-28 RX ORDER — METOPROLOL SUCCINATE 25 MG/1
25 TABLET, EXTENDED RELEASE ORAL
Refills: 0 | Status: ACTIVE | COMMUNITY
Start: 2024-01-25

## 2024-03-28 NOTE — REASON FOR VISIT
[FreeTextEntry1] : 1)Essential Hypertension--on losartan--HCTZ 2)Hyperlipidemia--on simvastatin 3) Hx of "Abnormal EKG"--LBBB 4) Asymptomatic Right Carotid Stenosis  NEW: Recent Hospitalization at [facility] for Syncope due to CHB Jan 5, 2024

## 2024-03-28 NOTE — PHYSICAL EXAM
[Well Developed] : well developed [Well Nourished] : well nourished [No Acute Distress] : no acute distress [Normal Venous Pressure] : normal venous pressure [No Carotid Bruit] : no carotid bruit [Normal S1, S2] : normal S1, S2 [No Gallop] : no gallop [No Rub] : no rub [Clear Lung Fields] : clear lung fields [No Respiratory Distress] : no respiratory distress  [Good Air Entry] : good air entry [Non Tender] : non-tender [Soft] : abdomen soft [No Masses/organomegaly] : no masses/organomegaly [Normal Bowel Sounds] : normal bowel sounds [Normal Gait] : normal gait [No Cyanosis] : no cyanosis [No Edema] : no edema [No Clubbing] : no clubbing [No Varicosities] : no varicosities [No Rash] : no rash [No Skin Lesions] : no skin lesions [No Focal Deficits] : no focal deficits [Moves all extremities] : moves all extremities [Alert and Oriented] : alert and oriented [Normal Speech] : normal speech [Normal memory] : normal memory [General Appearance - Well Developed] : well developed [Well Groomed] : well groomed [Normal Appearance] : normal appearance [General Appearance - Well Nourished] : well nourished [No Deformities] : no deformities [General Appearance - In No Acute Distress] : no acute distress [Normal Conjunctiva] : the conjunctiva exhibited no abnormalities [Eyelids - No Xanthelasma] : the eyelids demonstrated no xanthelasmas [No Oral Pallor] : no oral pallor [Normal Oral Mucosa] : normal oral mucosa [No Oral Cyanosis] : no oral cyanosis [Normal Jugular Venous A Waves Present] : normal jugular venous A waves present [Normal Jugular Venous V Waves Present] : normal jugular venous V waves present [No Jugular Venous Mac A Waves] : no jugular venous mac A waves [Respiration, Rhythm And Depth] : normal respiratory rhythm and effort [Auscultation Breath Sounds / Voice Sounds] : lungs were clear to auscultation bilaterally [Abdomen Soft] : soft [Exaggerated Use Of Accessory Muscles For Inspiration] : no accessory muscle use [Abdomen Mass (___ Cm)] : no abdominal mass palpated [Abdomen Tenderness] : non-tender [Abnormal Walk] : normal gait [Gait - Sufficient For Exercise Testing] : the gait was sufficient for exercise testing [Cyanosis, Localized] : no localized cyanosis [Nail Clubbing] : no clubbing of the fingernails [Petechial Hemorrhages (___cm)] : no petechial hemorrhages [Skin Color & Pigmentation] : normal skin color and pigmentation [No Venous Stasis] : no venous stasis [] : no rash [No Skin Ulcers] : no skin ulcer [Skin Lesions] : no skin lesions [Oriented To Time, Place, And Person] : oriented to person, place, and time [No Xanthoma] : no  xanthoma was observed [Mood] : the mood was normal [Affect] : the affect was normal [No Anxiety] : not feeling anxious [5th Left ICS - MCL] : palpated at the 5th LICS in the midclavicular line [Normal] : normal [Normal Rate] : normal [Rhythm Regular] : regular [Normal S1] : normal S1 [Normal S2] : normal S2 [No Murmur] : no murmurs heard [No Abnormalities] : the abdominal aorta was not enlarged and no bruit was heard [Right Carotid Bruit] : no bruit heard over the right carotid [Left Carotid Bruit] : no bruit heard over the left carotid [Right Femoral Bruit] : no bruit heard over the right femoral artery [Left Femoral Bruit] : no bruit heard over the left femoral artery [Rt] : no varicose veins of the right leg [Lt] : no varicose veins of the left leg

## 2024-03-28 NOTE — HISTORY OF PRESENT ILLNESS
[FreeTextEntry1] : The patient was initially referred by Dr. Guilherme Mo ([phone]) because of: 1) an abnormal EKG (LBBB)  20 Hypertension--on Losartan HCTZ 50/12.5 3) Hyperlipidemia--on Atorvastatin 20 mg 4) Asymptomatic Right Carotid stenosis--see below   =============================================================================== RECENT Events: She was hospitalized 2024, after suffering syncope at home.   She regained consciousness spontaneously and called 911.  She was brought to [facility] where an EKG revealed TYPE II second degree heart block. There were also periods of NSVT. A temporary pacemaker was placed. *** Cardiac catheterization revealed: RCA: Total occlusion with L to R collaterals from LAD, filling PDA LAD: Nonobstructive CAD L Main--nl LCX_80% mid lesion  Medical therapy was elected, due to head trauma, with plan to re-evaluate after recovery.  When she fell, she hit her head.  A CT was negative for any hemorrhage.  She then underwent implantation of a dual-chamber pacemaker with Dr. Guzman.  She is scheduled to follow-up. She is currently feeling well.  Slowly getting back to normal.  No anginal chest pains or shortness of breath.  No dizziness or lightheadedness.  No fevers or chills.   Carotid u/s  2024                                                   L: 50 to 69% left ICA stenosis of the left mid and distal internal carotid artery R: Mild plaque of less than 50% =================================================================================== The patient had no prior cardiac history. Specifically, no history of heart attack or chest pains that she can recall. No significant shortness of breath or dyspnea on exertion. No history of heart murmur or rheumatic fever. No history of dizziness, lightheadedness, syncope, or palpitations. No recent fevers or chills. Her weight is stable. --Risk factors for CAD include the following: Smoking (originally a pack and a half per day but now down to less than one pack) and positive family history (father  of an MI in his 60s). --There is no history of hypertension, diabetes, or hyperlipidemia although it is again noted that she had not seen a physician for many years. An EKG performed in this office reveals sinus rhythm with left bundle branch block. ============================================================================ ****Echocardiogram (2021): Normal LV size and contractility with an EF of 63%. Abnormal septal activation due to bundle branch block. Mild concentric LVH. Mild diastolic grade 1 dysfunction. Normal Tri leaflet aortic valve. Mild mitral annular calcification without MR or MS. Otherwise, normal study.

## 2024-03-28 NOTE — ASSESSMENT
[FreeTextEntry1] : Assessment Hypertension (401.9) (I10) Dyslipidemia (272.4) (E78.5) Complete left bundle branch block (426.3) (I44.7) Smoker (305.1) (F17.200) Abnormal ECG (794.31) (R94.31) Carotid artery disease (447.9) (I77.9) Assessment Coronary artery disease with stable angina pectoris (414.00,413.9) (I25.118) Hypertension (401.9) (I10) Dyslipidemia (272.4) (E78.5) Aortic stenosis (424.1) (I35.0) Encounter for preventive health examination (V70.0) (Z00.00)   1.  Status post a hospitalization for syncope and collapse January 5, 2024 due to complete heart block.  Status post temporary then permanent pacemaker implant.  Current pacemaker appears to be functioning well.  She will follow-up with the implanting surgeon today. 2.)  CAD--cardiac catheterization apparently revealed: Two-vessel coronary artery disease with an occluded right coronary artery and 80% left circumflex.  Fortunately, the LAD has only mild disease and LV function is normal.   Will refer to invasive cardiology for investigation as to need and feasibility of stenting of the left circumflex.  No utility in trying to open the right coronary artery.  The LAD is nonobstructive.  See no role for coronary bypass but stenting of the LCx might be advisable.  Will seek invasive cardiologist opinion.  #3) Carotid artery Dx, on ASA, statin. --Currently asymptomatic and nonobstructive.  No indication yet for intervention.  She remains on antiplatelet agents and fortunately has noncritical disease.  Further, she is asymptomatic.  She has stopped smoking which is a positive factor.   #4) essential hypertension --And blood pressure finally and nicely controlled on losartan hydrochlorothiazide. She is tolerating this nicely as well. Therefore, we will continue with this regimen.  #5 Mixed hyperlipidemia--now on Simvastatin, at goal  #6 lifelong cigarette smoking and addiction--Has now finally quit since the syncope and pacemaker implant.  .

## 2024-03-29 LAB
ALBUMIN SERPL ELPH-MCNC: 4.7 G/DL
ALP BLD-CCNC: 128 U/L
ALT SERPL-CCNC: 20 U/L
ANION GAP SERPL CALC-SCNC: 13 MMOL/L
AST SERPL-CCNC: 18 U/L
BILIRUB SERPL-MCNC: 0.5 MG/DL
BUN SERPL-MCNC: 11 MG/DL
CALCIUM SERPL-MCNC: 10.1 MG/DL
CHLORIDE SERPL-SCNC: 102 MMOL/L
CHOLEST SERPL-MCNC: 193 MG/DL
CO2 SERPL-SCNC: 27 MMOL/L
CREAT SERPL-MCNC: 0.58 MG/DL
EGFR: 99 ML/MIN/1.73M2
GLUCOSE SERPL-MCNC: 99 MG/DL
HCT VFR BLD CALC: 48.1 %
HDLC SERPL-MCNC: 76 MG/DL
HGB BLD-MCNC: 15.5 G/DL
LDLC SERPL CALC-MCNC: 95 MG/DL
MCHC RBC-ENTMCNC: 30.5 PG
MCHC RBC-ENTMCNC: 32.2 GM/DL
MCV RBC AUTO: 94.5 FL
NONHDLC SERPL-MCNC: 117 MG/DL
PLATELET # BLD AUTO: 250 K/UL
POTASSIUM SERPL-SCNC: 3.8 MMOL/L
PROT SERPL-MCNC: 7.4 G/DL
RBC # BLD: 5.09 M/UL
RBC # FLD: 14.6 %
SODIUM SERPL-SCNC: 142 MMOL/L
TRIGL SERPL-MCNC: 128 MG/DL
WBC # FLD AUTO: 9.32 K/UL

## 2024-04-26 ENCOUNTER — NON-APPOINTMENT (OUTPATIENT)
Age: 69
End: 2024-04-26

## 2024-04-26 ENCOUNTER — APPOINTMENT (OUTPATIENT)
Dept: HEART AND VASCULAR | Facility: CLINIC | Age: 69
End: 2024-04-26
Payer: COMMERCIAL

## 2024-04-26 VITALS
SYSTOLIC BLOOD PRESSURE: 112 MMHG | OXYGEN SATURATION: 94 % | HEIGHT: 67 IN | DIASTOLIC BLOOD PRESSURE: 68 MMHG | HEART RATE: 84 BPM | BODY MASS INDEX: 23.23 KG/M2 | WEIGHT: 148 LBS

## 2024-04-26 PROCEDURE — 93000 ELECTROCARDIOGRAM COMPLETE: CPT

## 2024-04-26 PROCEDURE — 99214 OFFICE O/P EST MOD 30 MIN: CPT

## 2024-04-26 NOTE — REASON FOR VISIT
[CV Risk Factors and Non-Cardiac Disease] : CV risk factors and non-cardiac disease [FreeTextEntry1] : 67 yo female with pmh sig fot htn, chol recent admission for chd with chb at [facility] for syncope  cath at [facility]- 2 v cad, rca  but robust collat from lad, lcx 80% stenosis  carotid cta without sig obst disease

## 2024-04-26 NOTE — DISCUSSION/SUMMARY
[FreeTextEntry1] : 1.cad- 2v cad, disc potions, cath at [facility] for lcx pci, med tx for rca  asa  2.CHB- 100% paced Rythm, will have EP follow device.  3.chol- lipitor 20 q d  4.htn- fatigue with toprol, htn well controlled, dc toprol, continue losartan  [EKG obtained to assist in diagnosis and management of assessed problem(s)] : EKG obtained to assist in diagnosis and management of assessed problem(s)

## 2024-05-06 ENCOUNTER — RESULT REVIEW (OUTPATIENT)
Age: 69
End: 2024-05-06

## 2024-05-08 ENCOUNTER — RESULT REVIEW (OUTPATIENT)
Age: 69
End: 2024-05-08

## 2024-05-10 ENCOUNTER — RESULT REVIEW (OUTPATIENT)
Age: 69
End: 2024-05-10

## 2024-05-11 ENCOUNTER — RESULT REVIEW (OUTPATIENT)
Age: 69
End: 2024-05-11

## 2024-05-11 ENCOUNTER — TRANSCRIPTION ENCOUNTER (OUTPATIENT)
Age: 69
End: 2024-05-11

## 2024-05-16 PROBLEM — I77.9 CAROTID ARTERY DISEASE: Status: ACTIVE | Noted: 2020-01-23

## 2024-05-16 PROBLEM — I10 HYPERTENSION: Status: ACTIVE | Noted: 2019-04-25

## 2024-05-16 PROBLEM — Z95.5 PRESENCE OF DRUG-ELUTING STENT IN LEFT CIRCUMFLEX CORONARY ARTERY: Status: ACTIVE | Noted: 2024-05-16

## 2024-05-16 PROBLEM — Z86.79 HISTORY OF COMPLETE HEART BLOCK: Status: ACTIVE | Noted: 2024-05-16

## 2024-05-16 PROBLEM — E78.5 DYSLIPIDEMIA: Status: ACTIVE | Noted: 2019-06-06

## 2024-05-16 PROBLEM — Z95.0 PRESENCE OF CARDIAC PACEMAKER: Status: ACTIVE | Noted: 2024-01-18

## 2024-05-23 ENCOUNTER — NON-APPOINTMENT (OUTPATIENT)
Age: 69
End: 2024-05-23

## 2024-05-23 ENCOUNTER — APPOINTMENT (OUTPATIENT)
Dept: CARDIOLOGY | Facility: CLINIC | Age: 69
End: 2024-05-23
Payer: COMMERCIAL

## 2024-05-23 VITALS
OXYGEN SATURATION: 95 % | HEIGHT: 67 IN | SYSTOLIC BLOOD PRESSURE: 110 MMHG | WEIGHT: 152 LBS | HEART RATE: 74 BPM | BODY MASS INDEX: 23.86 KG/M2 | DIASTOLIC BLOOD PRESSURE: 70 MMHG

## 2024-05-23 DIAGNOSIS — I77.9 DISORDER OF ARTERIES AND ARTERIOLES, UNSPECIFIED: ICD-10-CM

## 2024-05-23 DIAGNOSIS — E78.5 HYPERLIPIDEMIA, UNSPECIFIED: ICD-10-CM

## 2024-05-23 DIAGNOSIS — Z86.79 PERSONAL HISTORY OF OTHER DISEASES OF THE CIRCULATORY SYSTEM: ICD-10-CM

## 2024-05-23 DIAGNOSIS — I25.118 ATHEROSCLEROTIC HEART DISEASE OF NATIVE CORONARY ARTERY WITH OTHER FORMS OF ANGINA PECTORIS: ICD-10-CM

## 2024-05-23 DIAGNOSIS — Z95.0 PRESENCE OF CARDIAC PACEMAKER: ICD-10-CM

## 2024-05-23 DIAGNOSIS — Z95.5 PRESENCE OF CORONARY ANGIOPLASTY IMPLANT AND GRAFT: ICD-10-CM

## 2024-05-23 DIAGNOSIS — I10 ESSENTIAL (PRIMARY) HYPERTENSION: ICD-10-CM

## 2024-05-23 PROCEDURE — 36415 COLL VENOUS BLD VENIPUNCTURE: CPT

## 2024-05-23 PROCEDURE — 99214 OFFICE O/P EST MOD 30 MIN: CPT

## 2024-05-23 PROCEDURE — 93000 ELECTROCARDIOGRAM COMPLETE: CPT

## 2024-05-23 RX ORDER — CLOPIDOGREL BISULFATE 75 MG/1
75 TABLET, FILM COATED ORAL DAILY
Qty: 90 | Refills: 3 | Status: ACTIVE | COMMUNITY
Start: 1900-01-01 | End: 1900-01-01

## 2024-05-23 RX ORDER — ATORVASTATIN CALCIUM 40 MG/1
40 TABLET, FILM COATED ORAL DAILY
Qty: 90 | Refills: 3 | Status: ACTIVE | COMMUNITY
Start: 2020-05-22

## 2024-05-23 NOTE — HISTORY OF PRESENT ILLNESS
[FreeTextEntry1] : The patient was initially referred by Dr. Guilherme Mo ([phone]) because of: 1) an abnormal EKG (LBBB)  20 Hypertension--on Losartan HCTZ 50/12.5 3) Hyperlipidemia--on Atorvastatin 20 mg 4) Asymptomatic Right Carotid stenosis--see below   =============================================================================== RECENT Events: She was hospitalized 2024, after suffering syncope at home.   She regained consciousness spontaneously and called 911.  She was brought to [facility] where an EKG revealed TYPE II second degree heart block. There were also periods of NSVT. A temporary pacemaker was placed. *** Cardiac catheterization revealed: RCA: Total occlusion with L to R collaterals from LAD, filling PDA LAD: Nonobstructive CAD L Main--nl LCX_80% mid lesion  Medical therapy was elected, due to head trauma, with plan to re-evaluate after recovery.  When she fell, she hit her head.  A CT was negative for any hemorrhage.  She then underwent implantation of a dual-chamber pacemaker with Dr. Guzman.  She is scheduled to follow-up. She is currently feeling well.  Slowly getting back to normal.  No anginal chest pains or shortness of breath.  No dizziness or lightheadedness.  No fevers or chills.   Carotid u/s  2024                                                   L: 50 to 69% left ICA stenosis of the left mid and distal internal carotid artery R: Mild plaque of less than 50% =================================================================================== The patient had no prior cardiac history. Specifically, no history of heart attack or chest pains that she can recall. No significant shortness of breath or dyspnea on exertion. No history of heart murmur or rheumatic fever. No history of dizziness, lightheadedness, syncope, or palpitations. No recent fevers or chills. Her weight is stable. --Risk factors for CAD include the following: Smoking (originally a pack and a half per day but now down to less than one pack) and positive family history (father  of an MI in his 60s). --There is no history of hypertension, diabetes, or hyperlipidemia although it is again noted that she had not seen a physician for many years. An EKG performed in this office reveals sinus rhythm with left bundle branch block. ============================================================================ ****Echocardiogram (2021): Normal LV size and contractility with an EF of 63%. Abnormal septal activation due to bundle branch block. Mild concentric LVH. Mild diastolic grade 1 dysfunction. Normal Tri leaflet aortic valve. Mild mitral annular calcification without MR or MS. Otherwise, normal study. +++++++++++++++++++++++++++++++++++++++++++++++++++++++++++++++++++++++++++++ Currently: The patient was referred to Dr. Carline Dominique who recommended stenting of the LCx and medical treatment of the RCA. She was then hospitalized at [facility] where 2 sequential DEMARIO were deployed in the LCx.  No complications were encountered and the patient was discharged on her usual medications. She is now status post PCI of the LCx. She is feeling well without cardiovascular symptoms.

## 2024-05-23 NOTE — ASSESSMENT
[FreeTextEntry1] : Assessment Hypertension (401.9) (I10) Dyslipidemia (272.4) (E78.5) Complete left bundle branch block (426.3) (I44.7) Smoker (305.1) (F17.200) Abnormal ECG (794.31) (R94.31) Carotid artery disease (447.9) (I77.9) Assessment Coronary artery disease with stable angina pectoris (414.00,413.9) (I25.118) Hypertension (401.9) (I10) Dyslipidemia (272.4) (E78.5) Aortic stenosis (424.1) (I35.0) Encounter for preventive health examination (V70.0) (Z00.00)   1.  Status post hospitalization for syncope and collapse January 5, 2024, due to complete heart block.   ****Status post permanent pacemaker implant.  Current pacemaker appears to be functioning well.  She will follow-up with the implanting surgeon today.  2.)  CAD--cardiac catheterization apparently revealed: Two-vessel coronary artery disease with an occluded right coronary artery and 80% left circumflex.  Fortunately, the LAD has only mild disease and LV function is normal.    **Now, status post 2 drug-eluting stents to the mid left circumflex in May 2024.  She is now 2 weeks status post PCI and doing well.  #3) Carotid artery Dx, on ASA, statin. --Currently asymptomatic and nonobstructive.  No indication yet for intervention.  She remains on antiplatelet agents and fortunately has noncritical disease.  Further, she is asymptomatic.  She has stopped smoking which is a positive factor.   #4) essential hypertension --And blood pressure finally and nicely controlled on losartan hydrochlorothiazide. She is tolerating this nicely as well. Therefore, we will continue with this regimen.  #5 Mixed hyperlipidemia--now on Simvastatin, at goal  #6 lifelong cigarette smoking and addiction--Has now finally quit since the syncope and pacemaker implant.  .

## 2024-05-23 NOTE — REASON FOR VISIT
[FreeTextEntry1] : 1)Essential Hypertension--on losartan--HCTZ 2)Hyperlipidemia--on simvastatin 3) Hx of "Abnormal EKG"--LBBB 4) Asymptomatic Right Carotid Stenosis  NEW: Recent Hospitalization at [facility] for Syncope due to CHB Jan 5, 2024 ****Status post DDD pacemaker for complete heart block ****s/p Cath and later DESX2 to LCX May 10, 2024

## 2024-05-24 LAB
ALBUMIN SERPL ELPH-MCNC: 4.7 G/DL
ALP BLD-CCNC: 125 U/L
ALT SERPL-CCNC: 28 U/L
ANION GAP SERPL CALC-SCNC: 15 MMOL/L
AST SERPL-CCNC: 23 U/L
BILIRUB SERPL-MCNC: 0.6 MG/DL
BUN SERPL-MCNC: 11 MG/DL
CALCIUM SERPL-MCNC: 9.8 MG/DL
CHLORIDE SERPL-SCNC: 102 MMOL/L
CHOLEST SERPL-MCNC: 194 MG/DL
CO2 SERPL-SCNC: 23 MMOL/L
CREAT SERPL-MCNC: 0.54 MG/DL
EGFR: 101 ML/MIN/1.73M2
GLUCOSE SERPL-MCNC: 97 MG/DL
HCT VFR BLD CALC: 47.7 %
HDLC SERPL-MCNC: 78 MG/DL
HGB BLD-MCNC: 15.7 G/DL
LDLC SERPL CALC-MCNC: 87 MG/DL
MCHC RBC-ENTMCNC: 30.6 PG
MCHC RBC-ENTMCNC: 32.9 GM/DL
MCV RBC AUTO: 93 FL
NONHDLC SERPL-MCNC: 116 MG/DL
PLATELET # BLD AUTO: 274 K/UL
POTASSIUM SERPL-SCNC: 4.2 MMOL/L
PROT SERPL-MCNC: 7.3 G/DL
RBC # BLD: 5.13 M/UL
RBC # FLD: 15.3 %
SODIUM SERPL-SCNC: 140 MMOL/L
TRIGL SERPL-MCNC: 172 MG/DL
WBC # FLD AUTO: 8.71 K/UL

## 2024-08-01 ENCOUNTER — APPOINTMENT (OUTPATIENT)
Dept: CARDIOLOGY | Facility: CLINIC | Age: 69
End: 2024-08-01
Payer: COMMERCIAL

## 2024-08-01 ENCOUNTER — NON-APPOINTMENT (OUTPATIENT)
Age: 69
End: 2024-08-01

## 2024-08-01 VITALS
HEART RATE: 80 BPM | HEIGHT: 67 IN | OXYGEN SATURATION: 98 % | SYSTOLIC BLOOD PRESSURE: 100 MMHG | BODY MASS INDEX: 23.86 KG/M2 | DIASTOLIC BLOOD PRESSURE: 60 MMHG | WEIGHT: 152 LBS

## 2024-08-01 DIAGNOSIS — Z86.79 PERSONAL HISTORY OF OTHER DISEASES OF THE CIRCULATORY SYSTEM: ICD-10-CM

## 2024-08-01 DIAGNOSIS — E78.5 HYPERLIPIDEMIA, UNSPECIFIED: ICD-10-CM

## 2024-08-01 DIAGNOSIS — I10 ESSENTIAL (PRIMARY) HYPERTENSION: ICD-10-CM

## 2024-08-01 DIAGNOSIS — Z95.0 PRESENCE OF CARDIAC PACEMAKER: ICD-10-CM

## 2024-08-01 DIAGNOSIS — Z00.00 ENCOUNTER FOR GENERAL ADULT MEDICAL EXAMINATION W/OUT ABNORMAL FINDINGS: ICD-10-CM

## 2024-08-01 DIAGNOSIS — I44.7 LEFT BUNDLE-BRANCH BLOCK, UNSPECIFIED: ICD-10-CM

## 2024-08-01 DIAGNOSIS — Z95.5 PRESENCE OF CORONARY ANGIOPLASTY IMPLANT AND GRAFT: ICD-10-CM

## 2024-08-01 DIAGNOSIS — I25.118 ATHEROSCLEROTIC HEART DISEASE OF NATIVE CORONARY ARTERY WITH OTHER FORMS OF ANGINA PECTORIS: ICD-10-CM

## 2024-08-01 PROCEDURE — 93000 ELECTROCARDIOGRAM COMPLETE: CPT

## 2024-08-01 PROCEDURE — 36415 COLL VENOUS BLD VENIPUNCTURE: CPT

## 2024-08-01 PROCEDURE — 99214 OFFICE O/P EST MOD 30 MIN: CPT

## 2024-08-01 NOTE — HISTORY OF PRESENT ILLNESS
[FreeTextEntry1] : The patient  is seen in f/u for: 1) an abnormal EKG (LBBB)  2) Hypertension--on Losartan HCTZ 50/12.5 3) Hyperlipidemia--on Atorvastatin 20 mg 4) Asymptomatic Right Carotid stenosis--see below 5) CAD--see below   =============================================================================== Events: She was hospitalized 2024, after suffering syncope at home.   She regained consciousness spontaneously and called 911.  She was brought to [facility] where an EKG revealed TYPE II second degree heart block. There were also periods of NSVT. A temporary pacemaker was placed. *** Cardiac catheterization revealed: RCA: Total occlusion with L to R collaterals from LAD, filling PDA LAD: Nonobstructive CAD L Main--nl LCX_80% mid lesion  Medical therapy was temporarily elected, due to head trauma, with plan to re-evaluate after recovery. She was later referred to Dr. Carline Domniique who performed DEMARIO stenting of the LCx and medical treatment of the RCA on 5/10/24 at [facility] where 2 sequential DEMARIO were deployed in the LCx.  No complications were encountered and the patient was discharged on her usual medications.  When she fell, she hit her head.  A CT was negative for any hemorrhage.  She then underwent implantation of a dual-chamber pacemaker with Dr. Guzman.  She is scheduled to follow-up. She is currently feeling well.  Slowly getting back to normal.  No anginal chest pains or shortness of breath.  No dizziness or lightheadedness.  No fevers or chills.   Carotid u/s  2024                                                   L: 50 to 69% left ICA stenosis of the left mid and distal internal carotid artery R: Mild plaque of less than 50% =================================================================================== The patient had no prior cardiac history. Specifically, no history of heart attack or chest pains that she can recall. No significant shortness of breath or dyspnea on exertion. No history of heart murmur or rheumatic fever. No history of dizziness, lightheadedness, syncope, or palpitations. No recent fevers or chills. Her weight is stable. --Risk factors for CAD include the following: Smoking (originally a pack and a half per day but now down to less than one pack) and positive family history (father  of an MI in his 60s). --There is no history of hypertension, diabetes, or hyperlipidemia although it is again noted that she had not seen a physician for many years. An EKG performed in this office reveals sinus rhythm with left bundle branch block. ============================================================================ ****Echocardiogram (2021): Normal LV size and contractility with an EF of 63%. Abnormal septal activation due to bundle branch block. Mild concentric LVH. Mild diastolic grade 1 dysfunction. Normal Tri leaflet aortic valve. Mild mitral annular calcification without MR or MS. Otherwise, normal study. +++++++++++++++++++++++++++++++++++++++++++++++++++++++++++++++++++++++++++++ Currently:  She is now status post PCI of the LCx 5/10/24 She is feeling well without cardiovascular symptoms.  ****She currently lost her dental implant and will require multiple extractions.  She is embarrassed by her appearance and would like to proceed.  However she would have to be off Plavix for an extended period of time for this to be accomplished.  She has seen both dentistry and oral surgery.  ============================================================================================================= EKG performed today as follow-up of both coronary disease and implanted pacemaker: Normal sinus rhythm with high-grade block 100% atrial synchronous pacing with normal capture No significant change from prior

## 2024-08-01 NOTE — ASSESSMENT
[FreeTextEntry1] : Assessment Hypertension (401.9) (I10) Dyslipidemia (272.4) (E78.5) Complete left bundle branch block (426.3) (I44.7) Smoker (305.1) (F17.200) Abnormal ECG (794.31) (R94.31) Carotid artery disease (447.9) (I77.9) Assessment Coronary artery disease with stable angina pectoris (414.00,413.9) (I25.118) Hypertension (401.9) (I10) Dyslipidemia (272.4) (E78.5) Aortic stenosis (424.1) (I35.0) Encounter for preventive health examination (V70.0) (Z00.00)   1.  Status post hospitalization for syncope and collapse January 5, 2024, due to complete heart block.   ****Status post permanent pacemaker implant.  Current pacemaker appears to be functioning well.  She will follow-up with the implanting surgeon today.  2.)  CAD--cardiac catheterization apparently revealed: Two-vessel coronary artery disease with an occluded right coronary artery and 80% left circumflex.  Fortunately, the LAD has only mild disease and LV function is normal.    **Now, status post 2 drug-eluting stents to the mid left circumflex in May 2024.  She is now 2 Months status post PCI and doing well. **She will need to defer dental extractions.  I explained to her that we usually give a year of anticoagulation with Plavix.  However, because she is in pain and because of her appearance I explained that I would allow her to proceed 6 months after coronary stenting.  Hence we need to get her through the next 3 months.  She is willing to wait.  At that time we will clear her for dentistry.  I have asked her to discuss with her dentist whether this can be done in the hospital setting.  I have also reiterated to her the need for prophylactic antibiotics because of her implanted pacemaker.  She understands all of this.  #3) Carotid artery Dx, on ASA, statin. --Currently asymptomatic and nonobstructive.  No indication yet for intervention.  She remains on antiplatelet agents and fortunately has noncritical disease.  Further, she is asymptomatic.  She has stopped smoking which is a positive factor.   #4) essential hypertension --And blood pressure finally and nicely controlled on losartan hydrochlorothiazide. She is tolerating this nicely as well. Therefore, we will continue with this regimen.  #5 Mixed hyperlipidemia--now on Simvastatin, at goal  #6 lifelong cigarette smoking and addiction--Has now finally quit since the syncope and pacemaker implant.  .

## 2024-08-01 NOTE — REASON FOR VISIT
[FreeTextEntry1] : 1)Essential Hypertension--on losartan--HCTZ 2)Hyperlipidemia--on simvastatin 3) Hx of "Abnormal EKG"--LBBB 4) Asymptomatic Right Carotid Stenosis 5)***CAD, 2 v dx, with occluded RCA and s/p DEMARIO to LCX  NEW: Recent Hospitalization at [facility] for Syncope due to CHB Jan 5, 2024 ****Status post DDD pacemaker for complete heart block ****s/p Cath and later DESX2 to LCX May 10, 2024

## 2024-08-01 NOTE — REASON FOR VISIT
[FreeTextEntry1] : 1)Essential Hypertension--on losartan--HCTZ 2)Hyperlipidemia--on simvastatin 3) Hx of "Abnormal EKG"--LBBB 4) Asymptomatic Right Carotid Stenosis 5)***CAD, 2 v dx, with occluded RCA and s/p DEMARIO to LCX  NEW: Recent Hospitalization at [facility] for Syncope due to CHB Jan 5, 2024 ****Status post DDD pacemaker for complete heart block ****s/p Cath and later DESX2 to LCX May 10, 2024

## 2024-08-01 NOTE — HISTORY OF PRESENT ILLNESS
[FreeTextEntry1] : The patient  is seen in f/u for: 1) an abnormal EKG (LBBB)  2) Hypertension--on Losartan HCTZ 50/12.5 3) Hyperlipidemia--on Atorvastatin 20 mg 4) Asymptomatic Right Carotid stenosis--see below 5) CAD--see below   =============================================================================== Events: She was hospitalized 2024, after suffering syncope at home.   She regained consciousness spontaneously and called 911.  She was brought to [facility] where an EKG revealed TYPE II second degree heart block. There were also periods of NSVT. A temporary pacemaker was placed. *** Cardiac catheterization revealed: RCA: Total occlusion with L to R collaterals from LAD, filling PDA LAD: Nonobstructive CAD L Main--nl LCX_80% mid lesion  Medical therapy was temporarily elected, due to head trauma, with plan to re-evaluate after recovery. She was later referred to Dr. Carline Dominique who performed DEMARIO stenting of the LCx and medical treatment of the RCA on 5/10/24 at [facility] where 2 sequential DEMARIO were deployed in the LCx.  No complications were encountered and the patient was discharged on her usual medications.  When she fell, she hit her head.  A CT was negative for any hemorrhage.  She then underwent implantation of a dual-chamber pacemaker with Dr. Guzman.  She is scheduled to follow-up. She is currently feeling well.  Slowly getting back to normal.  No anginal chest pains or shortness of breath.  No dizziness or lightheadedness.  No fevers or chills.   Carotid u/s  2024                                                   L: 50 to 69% left ICA stenosis of the left mid and distal internal carotid artery R: Mild plaque of less than 50% =================================================================================== The patient had no prior cardiac history. Specifically, no history of heart attack or chest pains that she can recall. No significant shortness of breath or dyspnea on exertion. No history of heart murmur or rheumatic fever. No history of dizziness, lightheadedness, syncope, or palpitations. No recent fevers or chills. Her weight is stable. --Risk factors for CAD include the following: Smoking (originally a pack and a half per day but now down to less than one pack) and positive family history (father  of an MI in his 60s). --There is no history of hypertension, diabetes, or hyperlipidemia although it is again noted that she had not seen a physician for many years. An EKG performed in this office reveals sinus rhythm with left bundle branch block. ============================================================================ ****Echocardiogram (2021): Normal LV size and contractility with an EF of 63%. Abnormal septal activation due to bundle branch block. Mild concentric LVH. Mild diastolic grade 1 dysfunction. Normal Tri leaflet aortic valve. Mild mitral annular calcification without MR or MS. Otherwise, normal study. +++++++++++++++++++++++++++++++++++++++++++++++++++++++++++++++++++++++++++++ Currently:  She is now status post PCI of the LCx 5/10/24 She is feeling well without cardiovascular symptoms.  ****She currently lost her dental implant and will require multiple extractions.  She is embarrassed by her appearance and would like to proceed.  However she would have to be off Plavix for an extended period of time for this to be accomplished.  She has seen both dentistry and oral surgery.  ============================================================================================================= EKG performed today as follow-up of both coronary disease and implanted pacemaker: Normal sinus rhythm with high-grade block 100% atrial synchronous pacing with normal capture No significant change from prior

## 2024-08-02 LAB
ALBUMIN SERPL ELPH-MCNC: 4.7 G/DL
ALP BLD-CCNC: 127 U/L
ALT SERPL-CCNC: 19 U/L
ANION GAP SERPL CALC-SCNC: 15 MMOL/L
AST SERPL-CCNC: 19 U/L
BILIRUB SERPL-MCNC: 0.5 MG/DL
BUN SERPL-MCNC: 10 MG/DL
CALCIUM SERPL-MCNC: 10.1 MG/DL
CHLORIDE SERPL-SCNC: 102 MMOL/L
CHOLEST SERPL-MCNC: 193 MG/DL
CO2 SERPL-SCNC: 26 MMOL/L
CREAT SERPL-MCNC: 0.7 MG/DL
EGFR: 95 ML/MIN/1.73M2
GLUCOSE SERPL-MCNC: 105 MG/DL
HCT VFR BLD CALC: 51.4 %
HDLC SERPL-MCNC: 89 MG/DL
HGB BLD-MCNC: 16.1 G/DL
LDLC SERPL CALC-MCNC: 82 MG/DL
MCHC RBC-ENTMCNC: 31.3 GM/DL
MCHC RBC-ENTMCNC: 31.3 PG
MCV RBC AUTO: 99.8 FL
NONHDLC SERPL-MCNC: 104 MG/DL
PLATELET # BLD AUTO: 290 K/UL
POTASSIUM SERPL-SCNC: 3.5 MMOL/L
PROT SERPL-MCNC: 7.4 G/DL
RBC # BLD: 5.15 M/UL
RBC # FLD: 15.4 %
SODIUM SERPL-SCNC: 143 MMOL/L
TRIGL SERPL-MCNC: 132 MG/DL
WBC # FLD AUTO: 9.19 K/UL

## 2024-09-07 ENCOUNTER — NON-APPOINTMENT (OUTPATIENT)
Age: 69
End: 2024-09-07

## 2024-09-07 NOTE — HISTORY OF PRESENT ILLNESS
[FreeTextEntry1] : The patient is a 68-year-old G4, P4 postmenopausal white female with [place], [place], and [place] descent.  She underwent menarche at age 14 and had her first child at age 23.  She underwent menopause at age 52 and never took any hormone replacement therapy.  She has no family history of breast or ovarian cancer.  Her mother had renal cell carcinoma at age 34 and later passed away from metastatic disease to the lung.  Maternal aunt had AML at age 74.  Her sister had liver and bile duct cancer and passed away at age 51.  Her maternal cousin had a glioblastoma at age 51.  The patient developed a left breast mass around April 2019 and mammography and ultrasound on May 21 and 22, 2019 showed a left breast 9:00 well defined density which on ultrasound corresponded to a 1.4 cm mass.  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  She comes in for routine yearly follow-up and continues to get yearly mammography and ultrasound.

## 2024-09-07 NOTE — ASSESSMENT
[FreeTextEntry1] : The patient is a 68-year-old G4, P4 postmenopausal white female with [place], [place], and [place] descent.  She underwent menarche at age 14 and had her first child at age 23.  She underwent menopause at age 52 and never took any hormone replacement therapy.  She has no family history of breast or ovarian cancer.  Her mother had renal cell carcinoma at age 34 and later passed away from metastatic disease to the lung.  Maternal aunt had AML at age 74.  Her sister had liver and bile duct cancer and passed away at age 51.  Her maternal cousin had a glioblastoma at age 51.  The patient developed a left breast mass around April 2019 and mammography and ultrasound on May 21 and 22, 2019 showed a left breast 9:00 well defined density which on ultrasound corresponded to a 1.4 cm mass.  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  She underwent her last bilateral mammography and ultrasound which was reviewed from May 6, 2024 performed at [facility] which showed no suspicious findings.  On exam today, I cannot feel any suspicious densities in either breast. The patient was reassured and should continue yearly follow-up again in September 2025.  Her next bilateral mammography and ultrasound will be due in May 2025 and she was given prescriptions and will get this done here at [facility] and I will follow-up on the results.

## 2024-09-07 NOTE — PHYSICAL EXAM
[Normocephalic] : normocephalic [Atraumatic] : atraumatic [EOMI] : extra ocular movement intact [Supple] : supple [No Supraclavicular Adenopathy] : no supraclavicular adenopathy [No Cervical Adenopathy] : no cervical adenopathy [Examined in the supine and seated position] : examined in the supine and seated position [No dominant masses] : no dominant masses in right breast  [No dominant masses] : no dominant masses left breast [No Nipple Retraction] : no left nipple retraction [No Nipple Discharge] : no left nipple discharge [Breast Mass Right Breast ___cm] : no masses [Breast Mass Left Breast ___cm] : no masses [Breast Nipple Inversion] : nipples not inverted [Breast Nipple Retraction] : nipples not retracted [Breast Nipple Flattening] : nipples not flattened [Breast Nipple Fissures] : nipples not fissured [Breast Abnormal Lactation (Galactorrhea)] : no galactorrhea [Breast Abnormal Secretion Bloody Fluid] : no bloody discharge [Breast Abnormal Secretion Serous Fluid] : no serous discharge [Breast Abnormal Secretion Opalescent Fluid] : no milky discharge [No Axillary Lymphadenopathy] : no left axillary lymphadenopathy [No Edema] : no edema [No Rashes] : no rashes [No Ulceration] : no ulceration [de-identified] : On exam, the patient has ptotic C-cup breasts.  She has a well-healed left breast periareolar incision.  On palpation, I cannot feel any suspicious densities in either breast.  She has no axillary, supraclavicular, or cervical adenopathy.

## 2024-10-11 ENCOUNTER — RX RENEWAL (OUTPATIENT)
Age: 69
End: 2024-10-11

## 2024-10-15 ENCOUNTER — NON-APPOINTMENT (OUTPATIENT)
Age: 69
End: 2024-10-15

## 2024-11-07 ENCOUNTER — NON-APPOINTMENT (OUTPATIENT)
Age: 69
End: 2024-11-07

## 2024-11-07 ENCOUNTER — APPOINTMENT (OUTPATIENT)
Dept: CARDIOLOGY | Facility: CLINIC | Age: 69
End: 2024-11-07
Payer: COMMERCIAL

## 2024-11-07 VITALS — HEART RATE: 102 BPM | DIASTOLIC BLOOD PRESSURE: 70 MMHG | OXYGEN SATURATION: 99 % | SYSTOLIC BLOOD PRESSURE: 112 MMHG

## 2024-11-07 DIAGNOSIS — E78.5 HYPERLIPIDEMIA, UNSPECIFIED: ICD-10-CM

## 2024-11-07 DIAGNOSIS — Z00.00 ENCOUNTER FOR GENERAL ADULT MEDICAL EXAMINATION W/OUT ABNORMAL FINDINGS: ICD-10-CM

## 2024-11-07 DIAGNOSIS — I10 ESSENTIAL (PRIMARY) HYPERTENSION: ICD-10-CM

## 2024-11-07 DIAGNOSIS — I67.9 CEREBROVASCULAR DISEASE, UNSPECIFIED: ICD-10-CM

## 2024-11-07 DIAGNOSIS — Z95.5 PRESENCE OF CORONARY ANGIOPLASTY IMPLANT AND GRAFT: ICD-10-CM

## 2024-11-07 DIAGNOSIS — I25.118 ATHEROSCLEROTIC HEART DISEASE OF NATIVE CORONARY ARTERY WITH OTHER FORMS OF ANGINA PECTORIS: ICD-10-CM

## 2024-11-07 DIAGNOSIS — Z95.0 PRESENCE OF CARDIAC PACEMAKER: ICD-10-CM

## 2024-11-07 PROCEDURE — 36415 COLL VENOUS BLD VENIPUNCTURE: CPT

## 2024-11-07 PROCEDURE — 99214 OFFICE O/P EST MOD 30 MIN: CPT

## 2024-11-07 PROCEDURE — 93000 ELECTROCARDIOGRAM COMPLETE: CPT

## 2024-11-07 RX ORDER — AMOXICILLIN 500 MG/1
500 CAPSULE ORAL
Qty: 4 | Refills: 3 | Status: ACTIVE | COMMUNITY
Start: 2024-11-07 | End: 1900-01-01

## 2024-11-08 LAB
ALBUMIN SERPL ELPH-MCNC: 4.4 G/DL
ALP BLD-CCNC: 119 U/L
ALT SERPL-CCNC: 18 U/L
ANION GAP SERPL CALC-SCNC: 14 MMOL/L
AST SERPL-CCNC: 19 U/L
BILIRUB SERPL-MCNC: 0.6 MG/DL
BUN SERPL-MCNC: 11 MG/DL
CALCIUM SERPL-MCNC: 9.8 MG/DL
CHLORIDE SERPL-SCNC: 101 MMOL/L
CHOLEST SERPL-MCNC: 197 MG/DL
CO2 SERPL-SCNC: 25 MMOL/L
CREAT SERPL-MCNC: 0.7 MG/DL
EGFR: 94 ML/MIN/1.73M2
GLUCOSE SERPL-MCNC: 101 MG/DL
HCT VFR BLD CALC: 46.6 %
HDLC SERPL-MCNC: 85 MG/DL
HGB BLD-MCNC: 14.7 G/DL
LDLC SERPL CALC-MCNC: 91 MG/DL
MCHC RBC-ENTMCNC: 29.9 PG
MCHC RBC-ENTMCNC: 31.5 G/DL
MCV RBC AUTO: 94.7 FL
NONHDLC SERPL-MCNC: 112 MG/DL
PLATELET # BLD AUTO: 340 K/UL
POTASSIUM SERPL-SCNC: 4.2 MMOL/L
PROT SERPL-MCNC: 7.1 G/DL
RBC # BLD: 4.92 M/UL
RBC # FLD: 15.8 %
SODIUM SERPL-SCNC: 141 MMOL/L
TRIGL SERPL-MCNC: 122 MG/DL
WBC # FLD AUTO: 10.12 K/UL

## 2024-11-26 ENCOUNTER — RX RENEWAL (OUTPATIENT)
Age: 69
End: 2024-11-26

## 2024-11-26 ENCOUNTER — NON-APPOINTMENT (OUTPATIENT)
Age: 69
End: 2024-11-26

## 2024-12-16 DIAGNOSIS — R92.323 MAMMOGRAPHIC FIBROGLANDULAR DENSITY, BILATERAL BREASTS: ICD-10-CM

## 2024-12-16 DIAGNOSIS — Z12.31 ENCOUNTER FOR SCREENING MAMMOGRAM FOR MALIGNANT NEOPLASM OF BREAST: ICD-10-CM

## 2024-12-22 NOTE — PAST MEDICAL HISTORY
[Postmenopausal] : The patient is postmenopausal [Menarche Age ____] : age at menarche was [unfilled] [Menopause Age____] : age at menopause was [unfilled] [History of Hormone Replacement Treatment] : has no history of hormone replacement treatment [Definite ___ (Date)] : the last menstrual period was [unfilled] [Total Preg ___] : G[unfilled] [Live Births ___] : P[unfilled]  [Age At Live Birth ___] : Age at live birth: [unfilled] PAST MEDICAL HISTORY:  Fall     GERD (gastroesophageal reflux disease)     Glaucoma     History of osteoarthritis     Hypertension     MVP (mitral valve prolapse) possibly hx leaky valve

## 2025-01-21 ENCOUNTER — APPOINTMENT (OUTPATIENT)
Dept: BREAST CENTER | Facility: CLINIC | Age: 70
End: 2025-01-21
Payer: COMMERCIAL

## 2025-01-29 ENCOUNTER — APPOINTMENT (OUTPATIENT)
Dept: BREAST CENTER | Facility: CLINIC | Age: 70
End: 2025-01-29
Payer: COMMERCIAL

## 2025-01-29 VITALS
BODY MASS INDEX: 23.86 KG/M2 | HEART RATE: 84 BPM | DIASTOLIC BLOOD PRESSURE: 50 MMHG | OXYGEN SATURATION: 94 % | WEIGHT: 152 LBS | SYSTOLIC BLOOD PRESSURE: 105 MMHG | HEIGHT: 67 IN

## 2025-01-29 DIAGNOSIS — Z12.31 ENCOUNTER FOR SCREENING MAMMOGRAM FOR MALIGNANT NEOPLASM OF BREAST: ICD-10-CM

## 2025-01-29 DIAGNOSIS — R92.30 DENSE BREASTS, UNSPECIFIED: ICD-10-CM

## 2025-01-29 DIAGNOSIS — D24.2 BENIGN NEOPLASM OF LEFT BREAST: ICD-10-CM

## 2025-01-29 DIAGNOSIS — N60.11 DIFFUSE CYSTIC MASTOPATHY OF LEFT BREAST: ICD-10-CM

## 2025-01-29 DIAGNOSIS — N60.12 DIFFUSE CYSTIC MASTOPATHY OF LEFT BREAST: ICD-10-CM

## 2025-01-29 PROCEDURE — 99213 OFFICE O/P EST LOW 20 MIN: CPT

## 2025-03-06 ENCOUNTER — APPOINTMENT (OUTPATIENT)
Dept: CARDIOLOGY | Facility: CLINIC | Age: 70
End: 2025-03-06
Payer: COMMERCIAL

## 2025-03-06 ENCOUNTER — NON-APPOINTMENT (OUTPATIENT)
Age: 70
End: 2025-03-06

## 2025-03-06 VITALS
OXYGEN SATURATION: 94 % | DIASTOLIC BLOOD PRESSURE: 62 MMHG | TEMPERATURE: 97.1 F | SYSTOLIC BLOOD PRESSURE: 110 MMHG | HEART RATE: 85 BPM

## 2025-03-06 DIAGNOSIS — I65.29 OCCLUSION AND STENOSIS OF UNSPECIFIED CAROTID ARTERY: ICD-10-CM

## 2025-03-06 DIAGNOSIS — Z86.79 PERSONAL HISTORY OF OTHER DISEASES OF THE CIRCULATORY SYSTEM: ICD-10-CM

## 2025-03-06 DIAGNOSIS — I10 ESSENTIAL (PRIMARY) HYPERTENSION: ICD-10-CM

## 2025-03-06 DIAGNOSIS — E78.5 HYPERLIPIDEMIA, UNSPECIFIED: ICD-10-CM

## 2025-03-06 DIAGNOSIS — I25.118 ATHEROSCLEROTIC HEART DISEASE OF NATIVE CORONARY ARTERY WITH OTHER FORMS OF ANGINA PECTORIS: ICD-10-CM

## 2025-03-06 DIAGNOSIS — Z95.5 PRESENCE OF CORONARY ANGIOPLASTY IMPLANT AND GRAFT: ICD-10-CM

## 2025-03-06 DIAGNOSIS — Z95.0 PRESENCE OF CARDIAC PACEMAKER: ICD-10-CM

## 2025-03-06 DIAGNOSIS — Z00.00 ENCOUNTER FOR GENERAL ADULT MEDICAL EXAMINATION W/OUT ABNORMAL FINDINGS: ICD-10-CM

## 2025-03-06 PROCEDURE — 93000 ELECTROCARDIOGRAM COMPLETE: CPT

## 2025-03-06 PROCEDURE — 36415 COLL VENOUS BLD VENIPUNCTURE: CPT

## 2025-03-06 PROCEDURE — 99214 OFFICE O/P EST MOD 30 MIN: CPT

## 2025-03-07 LAB
ALBUMIN SERPL ELPH-MCNC: 4.4 G/DL
ALP BLD-CCNC: 130 U/L
ALT SERPL-CCNC: 14 U/L
ANION GAP SERPL CALC-SCNC: 13 MMOL/L
AST SERPL-CCNC: 21 U/L
BILIRUB SERPL-MCNC: 0.2 MG/DL
BUN SERPL-MCNC: 13 MG/DL
CALCIUM SERPL-MCNC: 9.8 MG/DL
CHLORIDE SERPL-SCNC: 105 MMOL/L
CHOLEST SERPL-MCNC: 174 MG/DL
CO2 SERPL-SCNC: 27 MMOL/L
CREAT SERPL-MCNC: 0.54 MG/DL
EGFRCR SERPLBLD CKD-EPI 2021: 100 ML/MIN/1.73M2
GLUCOSE SERPL-MCNC: 96 MG/DL
HCT VFR BLD CALC: 44.8 %
HDLC SERPL-MCNC: 73 MG/DL
HGB BLD-MCNC: 13.9 G/DL
LDLC SERPL CALC-MCNC: 74 MG/DL
MCHC RBC-ENTMCNC: 27.6 PG
MCHC RBC-ENTMCNC: 31 G/DL
MCV RBC AUTO: 89.1 FL
NONHDLC SERPL-MCNC: 101 MG/DL
PLATELET # BLD AUTO: 278 K/UL
POTASSIUM SERPL-SCNC: 4.5 MMOL/L
PROT SERPL-MCNC: 6.8 G/DL
RBC # BLD: 5.03 M/UL
RBC # FLD: 17.5 %
SODIUM SERPL-SCNC: 145 MMOL/L
TRIGL SERPL-MCNC: 160 MG/DL
WBC # FLD AUTO: 10.11 K/UL

## 2025-05-23 ENCOUNTER — RX RENEWAL (OUTPATIENT)
Age: 70
End: 2025-05-23

## 2025-07-03 ENCOUNTER — APPOINTMENT (OUTPATIENT)
Dept: CARDIOLOGY | Facility: CLINIC | Age: 70
End: 2025-07-03
Payer: COMMERCIAL

## 2025-07-03 VITALS
OXYGEN SATURATION: 99 % | HEART RATE: 73 BPM | SYSTOLIC BLOOD PRESSURE: 120 MMHG | DIASTOLIC BLOOD PRESSURE: 68 MMHG | TEMPERATURE: 98.3 F

## 2025-07-03 PROCEDURE — 36415 COLL VENOUS BLD VENIPUNCTURE: CPT

## 2025-07-03 PROCEDURE — 93000 ELECTROCARDIOGRAM COMPLETE: CPT

## 2025-07-03 PROCEDURE — 99214 OFFICE O/P EST MOD 30 MIN: CPT

## 2025-07-04 LAB
ALBUMIN SERPL ELPH-MCNC: 4.3 G/DL
ALP BLD-CCNC: 153 U/L
ALT SERPL-CCNC: 28 U/L
ANION GAP SERPL CALC-SCNC: 22 MMOL/L
AST SERPL-CCNC: 50 U/L
BILIRUB SERPL-MCNC: 0.4 MG/DL
BUN SERPL-MCNC: 10 MG/DL
CALCIUM SERPL-MCNC: 9.7 MG/DL
CHLORIDE SERPL-SCNC: 100 MMOL/L
CHOLEST SERPL-MCNC: 194 MG/DL
CO2 SERPL-SCNC: 17 MMOL/L
CREAT SERPL-MCNC: 0.45 MG/DL
EGFRCR SERPLBLD CKD-EPI 2021: 105 ML/MIN/1.73M2
GLUCOSE SERPL-MCNC: 90 MG/DL
HDLC SERPL-MCNC: 88 MG/DL
LDLC SERPL-MCNC: 84 MG/DL
NONHDLC SERPL-MCNC: 106 MG/DL
POTASSIUM SERPL-SCNC: 5 MMOL/L
PROT SERPL-MCNC: 7.4 G/DL
SODIUM SERPL-SCNC: 139 MMOL/L
TRIGL SERPL-MCNC: 131 MG/DL